# Patient Record
Sex: FEMALE | Race: OTHER | Employment: UNEMPLOYED | URBAN - METROPOLITAN AREA
[De-identification: names, ages, dates, MRNs, and addresses within clinical notes are randomized per-mention and may not be internally consistent; named-entity substitution may affect disease eponyms.]

---

## 2018-11-01 ENCOUNTER — OFFICE VISIT (OUTPATIENT)
Dept: FAMILY MEDICINE CLINIC | Age: 33
End: 2018-11-01

## 2018-11-01 DIAGNOSIS — Z53.21 PATIENT LEFT WITHOUT BEING SEEN: Primary | ICD-10-CM

## 2019-08-26 ENCOUNTER — OFFICE VISIT (OUTPATIENT)
Dept: FAMILY MEDICINE CLINIC | Age: 34
End: 2019-08-26

## 2019-08-26 VITALS
HEART RATE: 78 BPM | BODY MASS INDEX: 24.8 KG/M2 | RESPIRATION RATE: 19 BRPM | TEMPERATURE: 98.6 F | OXYGEN SATURATION: 99 % | WEIGHT: 123 LBS | SYSTOLIC BLOOD PRESSURE: 100 MMHG | HEIGHT: 59 IN | DIASTOLIC BLOOD PRESSURE: 65 MMHG

## 2019-08-26 DIAGNOSIS — Z34.90 PREGNANCY, UNSPECIFIED GESTATIONAL AGE: ICD-10-CM

## 2019-08-26 DIAGNOSIS — Z32.00 VISIT FOR CONFIRMATION OF PREGNANCY TEST RESULT WITH PHYSICAL EXAM: Primary | ICD-10-CM

## 2019-08-26 DIAGNOSIS — Z76.89 ENCOUNTER TO ESTABLISH CARE: ICD-10-CM

## 2019-08-26 LAB
HCG URINE, QL. (POC): POSITIVE
VALID INTERNAL CONTROL?: YES

## 2019-08-26 NOTE — PROGRESS NOTES
Results noted. Pt notified of results in clinic. Will set up prenatal appointment and dating ultrasound.     Pinky Small MD  PGY2 Family Medicine Resident

## 2019-08-26 NOTE — PATIENT INSTRUCTIONS
Renu Fire - [ Learning About Pregnancy ]  Instrucciones de cuidado    Holloway arvin shamir las primeras semanas del embarazo es de particular importancia para la arvin de holloway bebé. Cuídese. Cualquier cosa que perjudique holloway organismo puede perjudicar a holloway bebé. Asegúrese de asistir a todas estefania citas médicas. Los chequeos médicos regulares les ayudarán a usted y a holloway bebé a mantenerse saludables. La atención de seguimiento es manisha parte clave de holloway tratamiento y seguridad. Asegúrese de hacer y acudir a todas las citas, y llame a holloway médico si está teniendo problemas. También es manisha buena idea saber los resultados de estefania exámenes y mantener manisha lista de los medicamentos que pablito. ¿Cómo puede cuidarse en el hogar?   Dieta    · Siga manisha dieta balanceada. Asegúrese de incluir en holloway dieta abundantes frijoles (habichuelas), arvejas (chícharos) y verduras de hojas verdes.     · No se saltee las comidas ni pase muchas horas sin comer. Si tiene náuseas, trate de comer un refrigerio pequeño y saludable cada 2 a 3 horas.     · No consuma pescados que tengan [address] de carolina, adriano tiburón, pez shabana o [place]. No coma más de manisha charlene de atún a la semana.     · Albino abundantes líquidos, suficientes para que holloway orina sea de color amarillo benny o transparente adriano el agua. Si tiene [employer], el corazón o el hígado y tiene que Cammal's líquidos, hable con holloway médico antes de aumentar holloway consumo.     · Reduzca la cafeína, adriano el café, el té y las bebidas de cola.     · No albino alcohol, adriano cerveza, vino o licor alanna.     · [place] un multivitamínico que contenga al menos 400 microgramos (mcg) de ácido fólico para ayudar a prevenir los defectos congénitos (de nacimiento). El cereal enriquecido y el ceja integral son buenas fonseca adicionales de ácido fólico.     · Aumente el calcio en holloway Celesta Florentino. Trate de beber un cuarto de galón de [employer].  También podría morris suplementos de calcio y elegir alimentos adriano el queso y el yogur.   Geraldine Phenes de diane    · Asegúrese de asistir a las citas de seguimiento.     · Descanse lo suficiente. Mientras esté embarazada podría sentirse más cansada de lo normal.     · Brock por lo menos 30 minutos de ejercicio la mayoría de los días de la [place]. Caminar es manisha buena opción. Si no ha hecho ejercicio en el pasado, comience poco a poco. Brock varias caminatas cortas todos los días.     · No fume. Si necesita ayuda para dejar de fumar, hable con holloway médico sobre los programas para dejar de fumar. Éstos pueden aumentar estefania probabilidades de dejar el hábito para siempre.     · No toque heces de froylan ni holloway caja de excrementos. Además, lávese las vera luego de manipular carne cruda y cocine andrew toda la carne antes de comerla. Use guantes cuando trabaje en el jardín y [employer] vera cuando termine. Las heces de [place], la carne cruda o poco cocida, y la libia contaminada pueden causar infecciones que podrían perjudicar a holloway bebé o conducir a un aborto espontáneo.     · No use \"jacuzzis\" ni saunas. Elevar la temperatura corporal podría perjudicar a holloway bebé.   · Evite los gases de las sustancias químicas y la pintura, o los venenos.     · No use drogas ilegales ni albino alcohol. Medicamentos    · Revise todos los medicamentos que esté tomando con holloway Roselee Peaks sea necesario cambiar algunos de estefania medicamentos de rutina para proteger al bebé.   · [place] acetaminofén (Tylenol) para [facility], adriano dolor de [place] o de espalda leves o fiebre leve con síntomas de resfriado. No utilice medicamentos antiinflamatorios no esteroideos (RAVINDRA), tales adriano ibuprofeno (Advil, Motrin) o naproxeno (Aleve), a menos que holloway médico lo autorice.     · No tome dos o más analgésicos (medicamentos para el dolor) al [employer] tiempo a menos que el médico se lo haya indicado. Muchos analgésicos contienen acetaminofén, es decir, Tylenol.  El exceso de acetaminofén (Tylenol) puede ser dañino.     · [place] los medicamentos exactamente adriano le fueron recetados. Llame a holloway médico si vance estar teniendo problemas con holloway medicamento.    Para manejar las náuseas del embarazo    · Si siente náuseas al levantarse, pruebe morris un refrigerio pequeño (adriano galletas saladas) antes de salir de la cama. Dese algún tiempo para digerir el refrigerio y salga de la cama lentamente.     · No se saltee las comidas ni pase mucho tiempo sin comer. Un estómago vacío puede empeorar las náuseas.     · Consuma comidas pequeñas y frecuentes en lugar de renato comidas abundantes al día.     · [place] abundantes líquidos. Las bebidas deportivas, adriano Gatorade o Powerade, son buenas opciones.     · Consuma alimentos ricos en proteínas georgette bajos en grasas.     · Si está tomando suplementos de sandy, pregúntele a holloway médico si son necesarios. El sandy puede empeorar las náuseas.     · Evite cualquier [employer] le produzca náuseas, adriano el del café.     · Descanse mucho. Las náuseas del embarazo podrían empeorar si está cansada. ¿Dónde puede encontrar más información en inglés? Dipti pelayo http://al-lyndsey.info/. Trisha Babak E561 en la búsqueda para aprender más acerca de \"Aprenda sobre el [place]inny [place] - [ Vini Copping About Pregnancy ]. \"  Revisado: 5 septiembre, 2018  Versión del contenido: 12.1  © 9761-9099 Healthwise, Incorporated. Las instrucciones de cuidado fueron adaptadas bajo licencia por [employer] (which disclaims liability or warranty for this information). Si usted tiene [place] afección médica o sobre estas instrucciones, siempre pregunte a holloway profesional de arvin. [facility] niega toda garantía o responsabilidad por holloway uso de esta información.

## 2019-08-26 NOTE — PROGRESS NOTES
Progress Note    Patient: Rey Soliman MRN: <[id number]>  SSN: [id number]    YOB: 1985  Age: 29 y.o. Sex: female        Chief Complaint   Patient presents with    Confirmation     Pregnancy         Subjective:     Problems addressed:  Encounter Diagnoses     ICD-10-CM ICD-9-CM   1. Visit for confirmation of pregnancy test result with physical exam Z32.00 V72.40   2. Pregnancy, unspecified gestational age Z27.80 V22.2   1. Encounter to establish care Z76.89 V65.8     Pt 30 y/o F who presents to establish care and for prenatal care. Today is first time patient to be seen at our clinic, denies any prior PCP or care for current pregnancy. Pt denies any PMH, is not taking any medications. Pt is . Reports LMP was 19 but unsure of exact date. Had positive pregnancy test at home. Denies any fever, headache, chest pain, nausea, vomiting, vaginal bleeding or discharge. +FM. Says she has not had a pap smear done. Current and past medical information:    Current Medications after this visit[de-identified]     Current Outpatient Medications   Medication Sig    prenatal vit-calcium-iron-fa (PRENATAL PLUS WITH CALCIUM) 27 mg iron- 1 mg tab Take 1 Tab by mouth daily. No current facility-administered medications for this visit. There are no active problems to display for this patient. No past medical history on file. No Known Allergies    No past surgical history on file. Social History     Socioeconomic History    Marital status: SINGLE     Spouse name: Not on file    Number of children: Not on file    Years of education: Not on file    Highest education level: Not on file   Tobacco Use    Smoking status: Never Smoker   Substance and Sexual Activity    Alcohol use: Not Currently         Review of Systems   Constitutional: Negative for chills and fever. Respiratory: Negative for cough and shortness of breath. Cardiovascular: Negative for chest pain and palpitations. Gastrointestinal: Negative for abdominal pain, constipation, diarrhea, nausea and vomiting. Genitourinary: Negative for dysuria and frequency. Skin: Negative for itching and rash. Neurological: Negative for dizziness and headaches. Objective:     Vitals:    08/26/19 1121   BP: 100/65   Pulse: 78   Resp: 19   Temp: 98.6 °F (37 °C)   SpO2: 99%   Weight: 123 lb (55.8 kg)   Height: 4' 11\" (1.499 m)      Body mass index is 24.84 kg/m². Physical Exam   Constitutional: She appears well-developed and well-nourished. No distress. HENT:   Head: Normocephalic and atraumatic. Right Ear: External ear normal.   Left Ear: External ear normal.   Mouth/Throat: Oropharynx is clear and moist. No oropharyngeal exudate. Eyes: Pupils are equal, round, and reactive to light. Conjunctivae are normal.   Neck: Normal range of motion. Neck supple. Cardiovascular: Normal rate, regular rhythm and normal heart sounds. No murmur heard. Pulmonary/Chest: Effort normal and breath sounds normal. No respiratory distress. She has no wheezes. She has no rales. Abdominal: Soft. Bowel sounds are normal. There is no tenderness. Gravid, fundal height measured to 20cm, fetal heart rate 140 by doppler    Musculoskeletal: She exhibits no edema or deformity. Lymphadenopathy:     She has no cervical adenopathy. Neurological: She is alert. Skin: Skin is warm and dry. No rash noted. She is not diaphoretic. No erythema. Psychiatric: She has a normal mood and affect. Her behavior is normal.   Vitals reviewed. Health Maintenance Due   Topic Date Due    DTaP/Tdap/Td series (1 - Tdap) 04/21/2006    PAP AKA CERVICAL CYTOLOGY  04/21/2006    Influenza Age 9 to Adult  08/01/2019       Assessment and orders:     Encounter Diagnoses     ICD-10-CM ICD-9-CM   1. Visit for confirmation of pregnancy test result with physical exam Z32.00 V72.40   2. Pregnancy, unspecified gestational age Z27.80 V22.2   1.  Encounter to establish care Z76.89 V65.8       28 y/o F with confirmation of pregnancy    1. Pregnancy - positive UPT, pt states LMP 6/30/19 which would put [place] at [address], however, pt appears to be much farther along on exam, fundal height 20cm,  and easily obtained by doppler  -prenatal vit-calcium-iron-fa (PRENATAL PLUS WITH CALCIUM) 27 mg iron- 1 mg tab; Take 1 Tab by mouth daily. Dispense: 90 Tab; Refill: 1  -RTC in 1 week for initial prenatal visit, pt voiced understanding  -Pt to be scheduled for dating ultrasound as soon as possible           Plan of care:  Discussed diagnoses in detail with patient. Medication risks/benefits/side effects discussed with patient. All of the patient's questions were addressed. The patient understands and agrees with our plan of care. The patient knows to call back if they are unsure of or forget any changes we discussed today or if the symptoms change. The patient received an After-Visit Summary which contains VS, orders, medication list and allergy list. This can be used as a \"mini-medical record\" should they have to seek medical care while out of town. Follow-up and Dispositions    · Return in about 1 week (around 9/2/2019) for Initial Prenatal, also needs to be scheduled for dating ultrasound as soon as possible. No future appointments.     Signed By: Candi Puckett MD     August 26, 2019

## 2019-09-03 ENCOUNTER — INITIAL PRENATAL (OUTPATIENT)
Dept: FAMILY MEDICINE CLINIC | Age: 34
End: 2019-09-03

## 2019-09-03 VITALS
RESPIRATION RATE: 16 BRPM | WEIGHT: 124 LBS | HEIGHT: 59 IN | OXYGEN SATURATION: 100 % | SYSTOLIC BLOOD PRESSURE: 90 MMHG | TEMPERATURE: 98.1 F | BODY MASS INDEX: 25 KG/M2 | DIASTOLIC BLOOD PRESSURE: 56 MMHG | HEART RATE: 85 BPM

## 2019-09-03 DIAGNOSIS — Z34.00 INITIAL OBSTETRIC VISIT, ANTEPARTUM: Primary | ICD-10-CM

## 2019-09-03 LAB
ANTIBODY SCREEN, EXTERNAL: NEGATIVE
ANTIBODY SCREEN, EXTERNAL: NEGATIVE
BILIRUB UR QL STRIP: NEGATIVE
GLUCOSE UR-MCNC: NEGATIVE MG/DL
HBSAG, EXTERNAL: NEGATIVE
HIV, EXTERNAL: NEGATIVE
KETONES P FAST UR STRIP-MCNC: NEGATIVE MG/DL
PH UR STRIP: 7.5 [PH] (ref 4.6–8)
PROT UR QL STRIP: NEGATIVE
RPR, EXTERNAL: NORMAL
RUBELLA, EXTERNAL: NORMAL
SP GR UR STRIP: 1.02 (ref 1–1.03)
TYPE, ABO & RH, EXTERNAL: NORMAL
UA UROBILINOGEN AMB POC: NORMAL (ref 0.2–1)
URINALYSIS CLARITY POC: CLEAR
URINALYSIS COLOR POC: YELLOW
URINE BLOOD POC: NORMAL
URINE LEUKOCYTES POC: NEGATIVE
URINE NITRITES POC: NEGATIVE

## 2019-09-03 NOTE — PATIENT INSTRUCTIONS
[place] a holloway médico shamir el embarazo (hasta las 20 semanas) - [ Learning About When to Call Your Doctor During Pregnancy (Up to 20 Weeks) ]  Instrucciones de cuidado    Es normal que tenga inquietudes acerca de lo que podría ser un problema shamir el [facility]. Aunque la mayoría de las mujeres embarazadas no tienen ningún problema grave, es importante saber cuándo llamar a holloway médico si tiene determinados síntomas. Estas son algunas sugerencias generales. Holloway médico puede darle más información sobre cuándo llamar. Cuándo llamar a holloway médico (hasta las 20 semanas)  Llame al 911 en cualquier momento que sospeche que puede necesitar atención de [place]. Por ejemplo, llame si:  · Se desmayó (perdió el conocimiento). Llame a holloway médico ahora mismo o busque atención médica inmediata si:  · Tiene fiebre. · Tiene sangrado vaginal.  · Está mareada o aturdida, o siente que puede desmayarse. · Tiene síntomas de manisha infección del tracto urinario. Estos pueden incluir:  ? Dolor o ardor al orinar. ? Necesidad de orinar con frecuencia sin poder eliminar mucha orina. ? Dolor en el flanco, que se encuentra destiny debajo de la caja torácica y [place] de la cintura en un lado de la espalda. ? [employer]. · Tiene dolor abdominal.  · Daily que está teniendo contracciones. · Tiene manisha pérdida repentina de líquido por la vagina. Preste especial atención a los cambios en holloway arvin y asegúrese de comunicarse con holloway médico si:  · Tiene flujo vaginal con un olor desagradable. · Tiene otras inquietudes acerca de holloway embarazo. La atención de seguimiento es manisha parte clave de holloway tratamiento y seguridad. Asegúrese de hacer y acudir a todas las citas, y llame a holloway médico si está teniendo problemas. También es manisha buena idea saber los resultados de estefania exámenes y mantener manisha lista de los medicamentos que pablito. ¿Dónde puede encontrar más información en inglés?   Vidhi Klever a http://al-lyndsey.info/. Maryfrances Carrel [id number] en la búsqueda para aprender más acerca de \"Aprenda cuándo llamar a holloway médico shamir el embarazo ([facility] 20 semanas) - [ Learning About When to Call Your Doctor During Pregnancy (Up to 20 Weeks) ]. \"  Revisado: 5 septiembre, 2018  Versión del contenido: 12.1  © 9253-6660 Healthwise, Incorporated. Las instrucciones de cuidado fueron adaptadas bajo licencia por [employer] (which disclaims liability or warranty for this information). Si usmai tiene [place] afección médica o sobre estas instrucciones, siempre pregunte a holloway profesional de arvin. Healthwise, Incorporated niega toda garantía o responsabilidad por holloway uso de esta información. Conteo de las patadas de holloway bebé: Instrucciones de cuidado - [ [place] Your [de-identified] Marcio: Care Instructions ]  Instrucciones de cuidado    Contar las patadas de holloway bebé es manisha manera en la que holloway médico puede establecer si holloway bebé es saludable. La mayoría de las [facility], sobre todo en el primer embarazo, siente que holloway bebé se mueve por primera vez entre las semanas 12 y 25. El movimiento puede sentirse más adriano aleteos que adriano patadas. Es posible que holloway bebé se mueva más a ciertas horas del día. Cuando usmai [facility], puede notar menos patadas que cuando está descansando. En estefania visitas prenatales, holloway médico le preguntará si el bebé está activo. En el último trimestre, holloway médico le puede pedir que cuente la cantidad de veces que sienta que el bebé se [place]. La atención de seguimiento es manisha parte clave de holloway tratamiento y seguridad. Asegúrese de hacer y acudir a todas las citas, y llame a holloway médico si está teniendo problemas. También es manisha buena idea saber los resultados de estefania exámenes y mantener manisha lista de los medicamentos que pablito. ¿Cómo se cuentan las patadas fetales?   · Un método común para revisar el movimiento de holloway bebé es contar la cantidad de patadas o movimientos que sienta en 1 hora. Es normal sentir 10 movimientos (adriano patadas, aleteos o vueltas) en 1 hora. Algunos médicos sugieren que cuente shamir la [facility] llegar a los 10 movimientos. Luego usted puede dejar de contar por jabier día y comenzar otra vez al día siguiente. · Para contar, elija el momento del día en que holloway bebé esté más activo. Podría ser cualquier momento entre la mañana y la noche. · Si no siente 10 movimientos en manisha hora, es posible que holloway bebé esté durmiendo. Espere hasta la próxima hora y vuelva a contar. ¿Cuándo debe pedir ayuda? Llame a holloway médico ahora mismo o busque atención médica inmediata si:    · Siente que holloway bebé ha dejado de moverse o se mueve mucho menos de lo normal.    Preste especial atención a los cambios en holloway arvin y asegúrese de comunicarse con holloway médico si tiene cualquier problema. ¿Dónde puede encontrar más información en inglés? Cameron Bowser a http://al-lyndsey.info/. Demetra Rubens Y903 en la búsqueda para aprender más acerca de \"Conteo de las patadas de holloway bebé: Instrucciones de cuidado - [ Counting Your Baby's Kicks: Care Instructions ]. \"  Revisado: 5 septiembre, 2018  Versión del contenido: 12.1  © 5423-2354 Healthwise, Incorporated. Las instrucciones de cuidado fueron adaptadas bajo licencia por [employer] (which disclaims liability or warranty for this information). Si usted tiene [place] afección médica o sobre estas instrucciones, siempre pregunte a holloway profesional de arvin. Healthwise, Incorporated niega toda garantía o responsabilidad por holloway uso de esta información.

## 2019-09-03 NOTE — PROGRESS NOTES
28 yo female here for initial OB visit        Term, vaginal -- no complications    Declined genetic screening    PAP smear done today    Has MFM US scheduled on     I reviewed with the resident the medical history and the resident's findings on the physical examination. I discussed with the resident the patient's diagnosis and concur with the plan.

## 2019-09-03 NOTE — PROGRESS NOTES
Subjective:   Nelly Montenegro is a 29 y.o. female who is being seen today for her first obstetrical visit. [employer]  for Lebanese translation #[id number]    This is not a planned pregnancy. She is at Unknown gestation by LMP (LMP 19) which would put her at 9w2d but at her last visit, she was already 20cm by fundal height. [id number]  · G1  pregnancy - Delivery method: vaginal.  Fetal weight 3.800kg. Complications: none. · G2 pregnancy - Delivery method: vaginal. Fetal weight: 2.500kg. Complications: none. · G3  pregnancy - Delivery method: vaginal. Fetal weight: 4.500kg. Complications: none. OB History    Para Term  AB Living   4 3 3 0 0 3   SAB TAB Ectopic Molar Multiple Live Births   0 0 0 0 0 3      # Outcome Date GA Lbr John/2nd Weight Sex Delivery Anes PTL Lv   4 Current            3 Term 07 39w0d   F Vag-Spont None  ANDRE   2 Term 06 39w6d   M Vag-Spont None  ANDRE   1 Term 03 39w4d   M Vag-Spont None  ANDRE       History of GDM or DM? no    History of GHTN or HTN? no    History of pre-eclampsia? no    Relationship with FOB: spouse, not living together. He recently got deported back to his Country so she is here with her kids. Has sister who takes care of her and is very supportive. Yes taking prenatal vitamins. Desires second trimester fetal anatomy ultrasound? Yes    Desires genetic testing for Down's Syndrome? Not currently. Will think about it. No Fhx of genetic/chromosomal abnormalities      Allergies- reviewed:   No Known Allergies      Medications- reviewed:   Current Outpatient Medications   Medication Sig    prenatal vit-calcium-iron-fa (PRENATAL PLUS WITH CALCIUM) 27 mg iron- 1 mg tab Take 1 Tab by mouth daily. No current facility-administered medications for this visit. Past Medical History- reviewed:  No past medical history on file. Past Surgical History- reviewed:   No past surgical history on file.       Social History- reviewed:  Social History     Socioeconomic History    Marital status: SINGLE     Spouse name: Not on file    Number of children: Not on file    Years of education: Not on file    Highest education level: Not on file   Occupational History    Not on file   Social Needs    Financial resource strain: Not on file    Food insecurity:     Worry: Not on file     Inability: Not on file    Transportation needs:     Medical: Not on file     Non-medical: Not on file   Tobacco Use    Smoking status: Never Smoker    Smokeless tobacco: Never Used   Substance and Sexual Activity    Alcohol use: Not Currently    Drug use: Not Currently    Sexual activity: Yes   Lifestyle    Physical activity:     Days per week: Not on file     Minutes per session: Not on file    Stress: Not on file   Relationships    Social connections:     Talks on phone: Not on file     Gets together: Not on file     Attends Pent[employer]ostal service: Not on file     Active member of club or organization: Not on file     Attends meetings of clubs or organizations: Not on file     Relationship status: Not on file    Intimate partner violence:     Fear of current or ex partner: Not on file     Emotionally abused: Not on file     Physically abused: Not on file     Forced sexual activity: Not on file   Other Topics Concern    Not on file   Social History Narrative    Not on file         Immunizations- reviewed: There is no immunization history on file for this patient.       ROS  : Denies vaginal bleeding and leaking of fluid  GI:  No occasional nausea and vomiting      Objective:     Visit Vitals  BP 90/56 (BP 1 Location: Right arm, BP Patient Position: Sitting)   Pulse 85   Temp 98.1 °F (36.7 °C) (Oral)   Resp 16   Ht 4' 11\" (1.499 m)   Wt 124 lb (56.2 kg)   LMP 06/30/2019 (LMP Unknown)   SpO2 100%   BMI 25.04 kg/m²       Physical Exam:  GENERAL APPEARANCE: alert, well appearing, in no apparent distress  HEAD: normocephalic, atraumatic   LUNGS: clear to auscultation, no wheezes, rales or rhonchi, symmetric air entry  HEART: regular rate and rhythm, no murmurs  ABDOMEN: fundus soft, nontender 21 cm and FHT present at 140 bpm  BACK: no CVA tenderness  EXTERNAL GENITALIA: normal appearing vulva with no masses, tenderness or lesions  VAGINA: no abnormal discharge or lesions  CERVIX: no lesions or cervical motion tenderness  UTERUS: gravid  ADNEXA: no masses palpable and nontender  EXTREMITIES: no redness or tenderness in the calves or thighs, no edema  NEUROLOGICAL: alert, oriented, normal speech, no focal findings or movement disorder noted  SKIN: normal coloration and turgor, no rashes    Exam chaperoned by Shelly Andersen LPN    Labs:    Recent Results (from the past 12 hour(s))   AMB POC URINALYSIS DIP STICK AUTO W/O MICRO    Collection Time: 09/03/19 11:59 AM   Result Value Ref Range    Color (UA POC) Yellow     Clarity (UA POC) Clear     Glucose (UA POC) Negative Negative    Bilirubin (UA POC) Negative Negative    Ketones (UA POC) Negative Negative    Specific gravity (UA POC) 1.020 1.001 - 1.035    Blood (UA POC) Trace Negative    pH (UA POC) 7.5 4.6 - 8.0    Protein (UA POC) Negative Negative    Urobilinogen (UA POC) 0.2 mg/dL 0.2 - 1    Nitrites (UA POC) Negative Negative    Leukocyte esterase (UA POC) Negative Negative         Assessment:   34yoF [id number]  Pregnancy at unknown GA since LMP is inaccurate. ICD-10-CM ICD-9-CM    1.  Initial obstetric visit, antepartum Z34.00 V22.0 AMB POC URINALYSIS DIP STICK AUTO W/O MICRO      HEMOGLOBIN FRACTIONATION      VZV AB, IGG      CBC WITH AUTOMATED DIFF      HEP B SURFACE AG      RUBELLA AB, IGG      BLOOD TYPE, (ABO+RH)      ANTIBODY SCREEN      RPR      HIV 1/2 AG/AB, 4TH GENERATION,W RFLX CONFIRM      PAP IGP,APTIMA CTNG AGE GDLN      CULTURE, URINE         Plan:   · Initial labs/specimens collected (CBC, blood type & screen, Rh antibody screen, rubella AB IgG, HBsAg titer, RPR, HIV 4th generation reflex, UA and UCx, pap smear with gonorrhea/chlamydia PCR, VZV AB IgG, Hemoglobin Fractionation)  · Recommended prenatal vitamins  · Request for 2nd trimester fetal anatomy ultrasound faxed to [facility]. Appt set up for 9/12/19  · We discussed first trimester screening (including blood sampling), its significance and its limitations. We also informed her that only invasive testing-chorionic villus sampling ([facility]) or amniocentesis will give a definitive result on the fetal karyotype. After counseling, the patient opted to have NOT screening. · Genetic testing for Down Syndrome: Not interested currently. · UA today: Trace blood. Urine culture sent. · Follow-up in 2 week(s) for routine OB visit      Discussed the patient's BMI with her. The BMI follow up plan is as follows: Normal  BMI. Patient can gain 25-35lbs. The patient was counseled on the dangers of tobacco use and patient is a non-smoker. Emergency contact info confirmed:   Cell: [phone]    Orders Placed This Encounter    CULTURE, URINE    HEMOGLOBIN FRACTIONATION    VZV AB, IGG    CBC WITH AUTOMATED DIFF    HEP B SURFACE AG    RUBELLA AB, IGG    RPR    HIV 1/2 AG/AB, 4TH GENERATION,W RFLX CONFIRM    AMB POC URINALYSIS DIP STICK AUTO W/O MICRO    TYPE, ABO & RH    ANTIBODY SCREEN    PAP IGP,APTIMA CTNG AGE GDLN     Order Specific Question:   Pap Source? Answer:   Endocervical     Order Specific Question:   Total Hysterectomy? Answer:   No     Order Specific Question:   Supracervical Hysterectomy? Answer:   No     Order Specific Question:   Post Menopausal?     Answer:   No     Order Specific Question:   Hormone Therapy? Answer:   No     Order Specific Question:   IUD? Answer:   No     Order Specific Question:   Abnormal Bleeding? Answer:   No     Order Specific Question:   Pregnant     Answer:   Yes     Order Specific Question:   Post Partum?      Answer:   No         I have discussed the diagnosis with the patient and the intended plan as seen in the above orders. The patient has received an after-visit summary and questions were answered concerning future plans. I have discussed medication side effects and warnings with the patient as well. Informed pt to return to the office or go to the ER if she experiences vaginal bleeding, vaginal discharge, leaking of fluid, pelvic cramping.       Pt seen and discussed with Dr. Darron Steen (attending physician)    Mabel Dempsey MD  Family Medicine Resident

## 2019-09-03 NOTE — PROGRESS NOTES
Identified Patient with two Patient identifiers (Name and ). Two Patient Identifiers confirmed. Reviewed record in preparation for visit and have obtained necessary documentation. Chief Complaint   Patient presents with    Initial Prenatal Visit       Visit Vitals  BP 90/56 (BP 1 Location: Right arm, BP Patient Position: Sitting)   Pulse 85   Temp 98.1 °F (36.7 °C) (Oral)   Resp 16   Ht 4' 11\" (1.499 m)   Wt 124 lb (56.2 kg)   SpO2 100%   BMI 25.04 kg/m²       1. Have you been to the ER, urgent care clinic since your last visit? Hospitalized since your last visit? No    2. Have you seen or consulted any other health care providers outside of the [address] [place] [address] since your last visit? Include any pap smears or colon screening. No    + fetal movement. Patient denies any vaginal bleeding, LOF, or abnormal d/c.     Patient scheduled with [facility] 19 at [address] [place] [address] for ultrasound

## 2019-09-04 NOTE — PROGRESS NOTES
Pt presenting late for prenatal care    Will be getting MFM anatomy scan and dating in the next week as suspect she is already > 20 weeks based on resident's exam

## 2019-09-05 LAB
ABO GROUP BLD: NORMAL
BACTERIA UR CULT: NORMAL
BASOPHILS # BLD AUTO: 0 X10E3/UL (ref 0–0.2)
BASOPHILS NFR BLD AUTO: 0 %
BLD GP AB SCN SERPL QL: NEGATIVE
EOSINOPHIL # BLD AUTO: 0 X10E3/UL (ref 0–0.4)
EOSINOPHIL NFR BLD AUTO: 1 %
ERYTHROCYTE [DISTWIDTH] IN BLOOD BY AUTOMATED COUNT: 13.2 % (ref 12.3–15.4)
HBV SURFACE AG SERPL QL IA: NEGATIVE
HCT VFR BLD AUTO: 30.1 % (ref 34–46.6)
HGB A MFR BLD: 97.4 % (ref 96.4–98.8)
HGB A2 MFR BLD COLUMN CHROM: 2.6 % (ref 1.8–3.2)
HGB BLD-MCNC: 9.9 G/DL (ref 11.1–15.9)
HGB C MFR BLD: 0 %
HGB F MFR BLD: 0 % (ref 0–2)
HGB FRACT BLD-IMP: NORMAL
HGB OTHER MFR BLD HPLC: 0 %
HGB S BLD QL SOLY: NEGATIVE
HGB S MFR BLD: 0 %
HIV 1+2 AB+HIV1 P24 AG SERPL QL IA: NON REACTIVE
IMM GRANULOCYTES # BLD AUTO: 0.1 X10E3/UL (ref 0–0.1)
IMM GRANULOCYTES NFR BLD AUTO: 1 %
LYMPHOCYTES # BLD AUTO: 1.9 X10E3/UL (ref 0.7–3.1)
LYMPHOCYTES NFR BLD AUTO: 22 %
MCH RBC QN AUTO: 30.6 PG (ref 26.6–33)
MCHC RBC AUTO-ENTMCNC: 32.9 G/DL (ref 31.5–35.7)
MCV RBC AUTO: 93 FL (ref 79–97)
MONOCYTES # BLD AUTO: 0.4 X10E3/UL (ref 0.1–0.9)
MONOCYTES NFR BLD AUTO: 4 %
NEUTROPHILS # BLD AUTO: 6.3 X10E3/UL (ref 1.4–7)
NEUTROPHILS NFR BLD AUTO: 72 %
PLATELET # BLD AUTO: 281 X10E3/UL (ref 150–450)
RBC # BLD AUTO: 3.24 X10E6/UL (ref 3.77–5.28)
RH BLD: POSITIVE
RPR SER QL: NON REACTIVE
RUBV IGG SERPL IA-ACNC: 2.99 INDEX
VZV IGG SER IA-ACNC: 285 INDEX
WBC # BLD AUTO: 8.7 X10E3/UL (ref 3.4–10.8)

## 2019-09-10 LAB
AGE GDLN ACOG TESTING, 191185: NORMAL
C TRACH RRNA CVX QL NAA+PROBE: NEGATIVE
CYTOLOGIST CVX/VAG CYTO: ABNORMAL
CYTOLOGY CVX/VAG DOC CYTO: ABNORMAL
CYTOLOGY CVX/VAG DOC THIN PREP: ABNORMAL
DX ICD CODE: ABNORMAL
DX ICD CODE: ABNORMAL
HPV I/H RISK 4 DNA CVX QL PROBE+SIG AMP: POSITIVE
Lab: ABNORMAL
N GONORRHOEA RRNA CVX QL NAA+PROBE: NEGATIVE
OTHER STN SPEC: ABNORMAL
PATHOLOGIST CVX/VAG CYTO: ABNORMAL
STAT OF ADQ CVX/VAG CYTO-IMP: ABNORMAL

## 2019-09-12 ENCOUNTER — HOSPITAL ENCOUNTER (OUTPATIENT)
Dept: PERINATAL CARE | Age: 34
Discharge: HOME | End: 2019-09-12
Attending: OBSTETRICS & GYNECOLOGY
Payer: SELF-PAY

## 2019-09-12 PROCEDURE — 76805 OB US >/= 14 WKS SNGL FETUS: CPT | Performed by: OBSTETRICS & GYNECOLOGY

## 2019-09-18 ENCOUNTER — DOCUMENTATION ONLY (OUTPATIENT)
Dept: FAMILY MEDICINE CLINIC | Age: 34
End: 2019-09-18

## 2019-09-18 NOTE — PROGRESS NOTES
M ultrasound on 9/12/19:    GA: 29w4d with EDC 11/24/19 on 9/12/19  Anterior placenta  BREECH presentation  EFW 56%  Female fetus  BPP 8/8

## 2019-09-19 ENCOUNTER — TELEPHONE (OUTPATIENT)
Dept: FAMILY MEDICINE CLINIC | Age: 34
End: 2019-09-19

## 2019-09-19 DIAGNOSIS — O99.013 ANEMIA DURING PREGNANCY IN THIRD TRIMESTER: Primary | ICD-10-CM

## 2019-09-19 RX ORDER — FERROUS SULFATE 325(65) MG
325 TABLET, DELAYED RELEASE (ENTERIC COATED) ORAL
Qty: 90 TAB | Refills: 0 | Status: SHIPPED | OUTPATIENT
Start: 2019-09-19

## 2019-09-19 NOTE — TELEPHONE ENCOUNTER
[employer]  #[id number]    Contacted Patient because she missed her appointment on 9/17/19. Patient expressed financial difficulty as she received an expensive bill. Discussed with patient her latest U/S results and as well as prenatal labs. Will need colposcopy 6wks postpartum    Sent iron pill for her anemia to pharmacy.      Scheduled appt for patient: 9/25/19 at 11am

## 2019-09-25 ENCOUNTER — ROUTINE PRENATAL (OUTPATIENT)
Dept: FAMILY MEDICINE CLINIC | Age: 34
End: 2019-09-25

## 2019-09-25 VITALS
BODY MASS INDEX: 26 KG/M2 | RESPIRATION RATE: 18 BRPM | OXYGEN SATURATION: 100 % | SYSTOLIC BLOOD PRESSURE: 96 MMHG | HEIGHT: 59 IN | WEIGHT: 129 LBS | HEART RATE: 87 BPM | TEMPERATURE: 98.4 F | DIASTOLIC BLOOD PRESSURE: 59 MMHG

## 2019-09-25 DIAGNOSIS — Z3A.31 31 WEEKS GESTATION OF PREGNANCY: Primary | ICD-10-CM

## 2019-09-25 DIAGNOSIS — Z23 ENCOUNTER FOR IMMUNIZATION: ICD-10-CM

## 2019-09-25 LAB
BILIRUB UR QL STRIP: NEGATIVE
GLUCOSE UR-MCNC: NEGATIVE MG/DL
KETONES P FAST UR STRIP-MCNC: NEGATIVE MG/DL
PH UR STRIP: 7 [PH] (ref 4.6–8)
PROT UR QL STRIP: NEGATIVE
SP GR UR STRIP: 1.01 (ref 1–1.03)
UA UROBILINOGEN AMB POC: NORMAL (ref 0.2–1)
URINALYSIS CLARITY POC: CLEAR
URINALYSIS COLOR POC: YELLOW
URINE BLOOD POC: NEGATIVE
URINE LEUKOCYTES POC: NEGATIVE
URINE NITRITES POC: NEGATIVE

## 2019-09-25 NOTE — PROGRESS NOTES
Return OB Visit       Subjective:   Gwendolynn Render 29 y.o. [place]   MICHAEL: 11/24/2019, Date entered prior to episode creation  GA:  31w3d. [employer] #[id number]    LOF: No  Vaginal bleeding: No  Fetal movement (after 20 weeks): Yes  Contractions: No    Taking pnv and iron    Allergies- reviewed:   No Known Allergies  Medications- reviewed:   Current Outpatient Medications   Medication Sig    ferrous sulfate (IRON) 325 mg (65 mg iron) EC tablet Take 1 Tab by mouth Daily (before breakfast).  prenatal vit-calcium-iron-fa (PRENATAL PLUS WITH CALCIUM) 27 mg iron- 1 mg tab Take 1 Tab by mouth daily. No current facility-administered medications for this visit. Past Medical History- reviewed:  No past medical history on file. Past Surgical History- reviewed:   No past surgical history on file.   Social History- reviewed:  Social History     Socioeconomic History    Marital status: SINGLE     Spouse name: Not on file    Number of children: Not on file    Years of education: Not on file    Highest education level: Not on file   Occupational History    Not on file   Social Needs    Financial resource strain: Not on file    Food insecurity:     Worry: Not on file     Inability: Not on file    Transportation needs:     Medical: Not on file     Non-medical: Not on file   Tobacco Use    Smoking status: Never Smoker    Smokeless tobacco: Never Used   Substance and Sexual Activity    Alcohol use: Not Currently    Drug use: Not Currently    Sexual activity: Yes   Lifestyle    Physical activity:     Days per week: Not on file     Minutes per session: Not on file    Stress: Not on file   Relationships    Social connections:     Talks on phone: Not on file     Gets together: Not on file     Attends Judaism service: Not on file     Active member of club or organization: Not on file     Attends meetings of clubs or organizations: Not on file     Relationship status: Not on file    Intimate partner violence:     Fear of current or ex partner: Not on file     Emotionally abused: Not on file     Physically abused: Not on file     Forced sexual activity: Not on file   Other Topics Concern    Not on file   Social History Narrative    Not on file     Immunizations- reviewed: There is no immunization history for the selected administration types on file for this patient.       Objective:     Visit Vitals  BP 96/59 (BP 1 Location: Right arm, BP Patient Position: Sitting)   Pulse 87   Temp 98.4 °F (36.9 °C) (Oral)   Resp 18   Ht 4' 11\" (1.499 m)   Wt 129 lb (58.5 kg)   LMP 2019 (Exact Date)   SpO2 100%   BMI 26.05 kg/m²       Physical Exam:  GENERAL APPEARANCE: alert, well appearing, in no apparent distress  ABDOMEN: gravid, fundal height 31 cm, FHT present at 145 bpm  PSYCH: normal mood and affect    Labs  Recent Results (from the past 12 hour(s))   AMB POC URINALYSIS DIP STICK AUTO W/O MICRO    Collection Time: 19 10:55 AM   Result Value Ref Range    Color (UA POC) Yellow     Clarity (UA POC) Clear     Glucose (UA POC) Negative Negative    Bilirubin (UA POC) Negative Negative    Ketones (UA POC) Negative Negative    Specific gravity (UA POC) 1.015 1.001 - 1.035    Blood (UA POC) Negative Negative    pH (UA POC) 7.0 4.6 - 8.0    Protein (UA POC) Negative Negative    Urobilinogen (UA POC) 0.2 mg/dL 0.2 - 1    Nitrites (UA POC) Negative Negative    Leukocyte esterase (UA POC) Negative Negative         Assessment         ICD-10-CM ICD-9-CM    1. 31 weeks gestation of pregnancy Z3A.31 V22.2 AMB POC URINALYSIS DIP STICK AUTO W/O MICRO      GLUCOSE, GESTATIONAL 1 HR TOLERANCE   2. Encounter for immunization Z23 V03.89 TETANUS, DIPHTHERIA TOXOIDS AND ACELLULAR PERTUSSIS VACCINE (TDAP), IN INDIVIDS. >=7, IM      UT IMMUNIZ ADMIN,1 SINGLE/COMB VAC/TOXOID      INFLUENZA VIRUS VAC QUAD,SPLIT,PRESV FREE SYRINGE IM      UT IMMUNIZ,ADMIN,EACH ADDL         Plan   29 y.o.  31w3d MICHAEL 2019, Date entered prior to episode creation here for return OB visit     · SIUP  · RH +. Normal PNL. · Declined genetic Screening  · S/p dating/anatomy scan on 9/12/19. Breech presentation @29wks. Female fetus. Anterior placenta  · UA unremarkable today. · Influenza, Tdap, and 1hr GTT today. · Follow up in 2 weeks   · Abnormal pap: ASCUS and HPV positive. Patient request to have it deferred until 6 wks PP  · Late to care. Initial OB at 29 weeks  · Anemia: Hg 9.9 (9/3/19). Iron supplement      Orders Placed This Encounter    MS IMMUNIZ ADMIN,1 SINGLE/COMB VAC/TOXOID    MS IMMUNIZ,ADMIN,EACH ADDL    TETANUS, DIPHTHERIA TOXOIDS AND ACELLULAR PERTUSSIS VACCINE (TDAP), IN INDIVIDS. >=7, IM    INFLUENZA VIRUS VACCINE QUADRIVALENT, PRESERVATIVE FREE SYRINGE (44720)    GLUCOSE, GESTATIONAL 1 HR TOLERANCE    AMB POC URINALYSIS DIP STICK AUTO W/O MICRO     Labor precautions discussed, including: Regular painful contractions, lasting for greater than one hour, taking your breath away; any vaginal bleeding; any leakage of fluid; or absent or decreased fetal movement. Call M.D. on call if any of these symptoms or signs occur. I have discussed the diagnosis with the patient and the intended plan as seen in the above orders. The patient has received an after-visit summary and questions were answered concerning future plans. I have discussed medication side effects and warnings with the patient as well. Informed pt to return to the office or go to the ER if she experiences vaginal bleeding, vaginal discharge, leaking of fluid, pelvic cramping.     Pt discussed with Dr. Josh Foley (attending physician)    Pako Farias MD  Family Medicine Resident

## 2019-09-25 NOTE — PROGRESS NOTES
I reviewed with the resident the medical history and the resident's findings on the physical examination. I discussed with the resident the patient's diagnosis and concur with the plan. Estimated Date of Delivery: 19    35yo  @ 31w3d (hx  x3)  1. IUP: getting GTT, flu and tdap. RH pos. Declined genetic testing. Normal anatomy. 2.  Anemia: on iron   3. Late to care: 29 wk   4.   Abnormal pap: pt desires to postpone colpo to 6wk PP

## 2019-09-25 NOTE — PATIENT INSTRUCTIONS
Conteo de las patadas de holloway bebé: Instrucciones de cuidado - [ Madonna Herd Your [de-identified] Kicks: Care Instructions ]  Instrucciones de cuidado    Contar las patadas de holloway bebé es manisha manera en la que holloway médico puede establecer si holloway bebé es saludable. La mayoría de las [facility], sobre todo en el primer embarazo, siente que holloway bebé se mueve por primera vez entre las semanas 12 y 25. El movimiento puede sentirse más adriano aleteos que adriano patadas. Es posible que holloway bebé se mueva más a ciertas horas del día. Cuando usted [place], puede notar menos patadas que cuando está descansando. En estefania visitas prenatales, holloway médico le preguntará si el bebé está activo. En el último trimestre, holloway médico le puede pedir que cuente la cantidad de veces que sienta que el bebé se [place]. La atención de seguimiento es manisha parte clave de holloway tratamiento y seguridad. Asegúrese de hacer y acudir a todas las citas, y llame a holloway médico si está teniendo problemas. También es manisha buena idea saber los resultados de estefania exámenes y mantener manisha lista de los medicamentos que pablito. ¿Cómo se cuentan las patadas fetales? · Un método común para revisar el movimiento de holloway bebé es contar la cantidad de patadas o movimientos que sienta en 1 hora. Es normal sentir 10 movimientos (adriano patadas, aleteos o vueltas) en 1 hora. Algunos médicos sugieren que cuente shamir la [facility] llegar a los 10 movimientos. Luego usted puede dejar de contar por jabier día y comenzar otra vez al día siguiente. · Para contar, elija el momento del día en que holloway bebé esté más activo. Podría ser cualquier momento entre la mañana y la noche. · Si no siente 10 movimientos en manisha hora, es posible que holloway bebé esté durmiendo. Espere hasta la próxima hora y vuelva a contar. ¿Cuándo debe pedir ayuda?   Llame a holloway médico ahora mismo o busque atención médica inmediata si:    · Siente que holloway bebé ha dejado de moverse o se mueve mucho menos de lo normal.    Preste especial atención a los cambios en holloway arvin y asegúrese de comunicarse con holloway médico si tiene cualquier problema. ¿Dónde puede encontrar más información en inglés? Emmie Beaver a http://al-lyndsey.info/. Topher John T993 en la búsqueda para aprender más acerca de \"Conteo de las patadas de holloway bebé: Instrucciones de cuidado - [ Counting Your Baby's Kicks: Care Instructions ]. \"  Revisado: 29 crisostomo, 2019  Versión del contenido: 12.2  © 2012-9931 5 examples, [employer]. Las instrucciones de cuidado fueron adaptadas bajo licencia por [employer] (which disclaims liability or warranty for this information). Si usted tiene [place] afección médica o sobre estas instrucciones, siempre pregunte a holloway profesional de arvin.  5 examples, [employer] niega toda garantía o responsabilidad por holloway uso de esta información.

## 2019-09-25 NOTE — PROGRESS NOTES
Identified Patient with two Patient identifiers (Name and ). Two Patient Identifiers confirmed. Reviewed record in preparation for visit and have obtained necessary documentation. Chief Complaint   Patient presents with    Routine Prenatal Visit     31w3d       Visit Vitals  BP 96/59 (BP 1 Location: Right arm, BP Patient Position: Sitting)   Pulse 87   Temp 98.4 °F (36.9 °C) (Oral)   Resp 18   Ht 4' 11\" (1.499 m)   Wt 129 lb (58.5 kg)   SpO2 100%   BMI 26.05 kg/m²       1. Have you been to the ER, urgent care clinic since your last visit? Hospitalized since your last visit? No    2. Have you seen or consulted any other health care providers outside of the [address] [place] [address] since your last visit? Include any pap smears or colon screening. No    + fetal movement. Patient denies any vaginal bleeding, LOF, or abnormal d/c.

## 2019-09-26 LAB — GLUCOSE 1H P 50 G GLC PO SERPL-MCNC: 91 MG/DL (ref 65–139)

## 2019-09-27 LAB — GTT, 1 HR, GLUCOLA, EXTERNAL: 91

## 2019-10-09 ENCOUNTER — ROUTINE PRENATAL (OUTPATIENT)
Dept: FAMILY MEDICINE CLINIC | Age: 34
End: 2019-10-09

## 2019-10-09 VITALS
SYSTOLIC BLOOD PRESSURE: 99 MMHG | DIASTOLIC BLOOD PRESSURE: 61 MMHG | BODY MASS INDEX: 26.73 KG/M2 | TEMPERATURE: 96.9 F | RESPIRATION RATE: 18 BRPM | HEART RATE: 81 BPM | HEIGHT: 59 IN | WEIGHT: 132.6 LBS | OXYGEN SATURATION: 99 %

## 2019-10-09 DIAGNOSIS — O99.619 GASTROESOPHAGEAL REFLUX IN PREGNANCY: ICD-10-CM

## 2019-10-09 DIAGNOSIS — Z3A.33 33 WEEKS GESTATION OF PREGNANCY: Primary | ICD-10-CM

## 2019-10-09 DIAGNOSIS — K21.9 GASTROESOPHAGEAL REFLUX IN PREGNANCY: ICD-10-CM

## 2019-10-09 DIAGNOSIS — O99.013 ANEMIA DURING PREGNANCY IN THIRD TRIMESTER: ICD-10-CM

## 2019-10-09 LAB
BILIRUB UR QL STRIP: NEGATIVE
GLUCOSE UR-MCNC: NEGATIVE MG/DL
HGB BLD-MCNC: 10.5 G/DL
KETONES P FAST UR STRIP-MCNC: NEGATIVE MG/DL
PH UR STRIP: 7 [PH] (ref 4.6–8)
PROT UR QL STRIP: NEGATIVE
SP GR UR STRIP: 1.01 (ref 1–1.03)
UA UROBILINOGEN AMB POC: NORMAL (ref 0.2–1)
URINALYSIS CLARITY POC: CLEAR
URINALYSIS COLOR POC: YELLOW
URINE BLOOD POC: NEGATIVE
URINE LEUKOCYTES POC: NEGATIVE
URINE NITRITES POC: NEGATIVE

## 2019-10-09 RX ORDER — FAMOTIDINE 10 MG/1
10 TABLET ORAL 2 TIMES DAILY
Qty: 60 TAB | Refills: 3 | Status: SHIPPED | OUTPATIENT
Start: 2019-10-09 | End: 2019-11-16

## 2019-10-09 NOTE — PATIENT INSTRUCTIONS
PEPCID for HEARTBURN two times a day. PEPCID para reflujo ácido dos veces al día   ------    Semanas 32 a 34 de guevara embarazo: Instrucciones de cuidado - [ Verlie Alpha 32 to 29 of Your Pregnancy: Care Instructions ]  Instrucciones de cuidado    Shamir las últimas semanas de guevara Fay Garcia, usted podría sentir más marva y ANDOVER. Es importante que descanse cuando pueda. Guevara bebé en crecimiento está ejerciendo más presión sobre guevara vejiga. Por eso, mike vez necesite orinar con más frecuencia. Las hemorroides también son comunes. Estas son venas en la charissa del recto que causan dolor y comezón. En la semana 36, a la mayoría de las [employer] un análisis para detectar el estreptococo del debbi B (GBS, por estefania siglas en inglés). El GBS es manisha bacteria común que puede vivir en la vagina y el recto. Podría enfermar a guevara bebé después del nacimiento. Si el [employer] positivo, le darán antibióticos shamir el [place]. Estos prevendrán que el bebé se contagie con la bacteria. Podría convenirle hablar con guevara médico sobre poner en un banco la ortega del cordón umbilical de guevara bebé. Esta es la ortega que queda en el cordón después del nacimiento. Si desea guardar esta ortega, debe hacer los trámites necesarios con anticipación. No puede decidirlo en el último minuto. Si todavía no le olmedo aplicado la vacuna Tdap (tétanos, difteria y tos [place]) shamir parth [facility]i Garcia, hable con guevara médico acerca de aplicársela. Mariely Mock a proteger a guevara recién nacido contra la infección por tos ferina. La atención de seguimiento es manisha parte clave de guevara tratamiento y seguridad. Asegúrese de hacer y acudir a todas las citas, y llame a guevara médico si está teniendo problemas. También es manisha buena idea saber los resultados de estefania exámenes y mantener manisha lista de los medicamentos que pablito. ¿Cómo puede cuidarse en el hogar? Alivio de las hemorroides  · Aumente en guevara dieta la cantidad de líquidos, frutas, verduras y [place].  [place] ayudará a mantener blandas las heces. · Evite estar sentada shamir demasiado tiempo. Recuéstese sobre el lado honey varias veces al día. · Límpiese con papel higiénico suave y húmedo. O puede utilizar compresas de infusión de hamamelis [facility] (\"witch hazel\") o toallas de higiene personal.  · Si tiene malestar, pruebe a usar compresas de hielo. O puede hacer juventino de asiento tibios. Hágalos shamir 20 minutos por vez, según lo necesite. · Use manisha crema con hidrocortisona para el dolor y la picazón. Edrie Rho son Anusol y Preparation H Hydrocortisone. · Pregúntele a holloway médico si puede morris un ablandador de heces de venta clementina. Considere el amamantamiento  · Los expertos recomiendan que las mujeres amamanten por Alvaro Dhaliwal. La leche materna es el mejor alimento para los bebés. · A los bebés les resulta más fácil digerir la [employer] materna que la [employer] de [place]. Y siempre está disponible, tiene la temperatura ideal y es gratuita. · [place] ayudar a proteger a holloway bebé de algunos problemas de arvin. En comparación con los bebés alimentados con leche de [place], los bebés que se alimentan con leche materna tienen menos probabilidades de:  ? Eva Kaylin infecciones de oído, resfriados, diarrea y neumonía. ? Ser obesos o tener diabetes más adelante en holloway diane. · [employer] a radha bebés tienen menos sangrado después del Adolph. Radha úteros también recobran holloway tamaño normal más rápido. · Algunas mujeres que amamantan bajan de [place] rápido. Elaborar leche quema calorías. · El amamantamiento puede disminuir el riesgo de tener cáncer de seno (mama), cáncer de ovario y osteoporosis. Decida sobre la circuncisión para los niños  · Al morris esta decisión, podría ser de ayuda pensar en radha tradiciones personales, religiosas y familiares. Es holloway decisión si desea conservar el pene de holloway hijo natural o circuncidar a holloway hijo.   · Si decide que le gustaría que circuncidaran a holloway bebé, hable con holloway Marcel Dural cualquier inquietud que tenga sobre el dolor. También puede hablar acerca de estefania preferencias para la anestesia. ¿Dónde puede encontrar más información en inglés? Gavin Patel a http://al-lyndsey.info/. Aleena Severs I906 en la búsqueda para aprender más acerca de \"Semanas 28 a 29 de guevara embarazo: Instrucciones de cuidado - [ Davie Daryl 32 to 29 of Your Pregnancy: Care Instructions ]. \"  Revisado: 29 Post, 2019  Versión del contenido: 12.2  © 5645-7351 Healthwise, Incorporated. Las instrucciones de cuidado fueron adaptadas bajo licencia por [employer] (which disclaims liability or warranty for this information). Si usmai tiene Cassia Walling afección médica o sobre estas instrucciones, siempre pregunte a guevara profesional de arvin. Healthwise, Incorporated niega toda garantía o responsabilidad por guevara uso de esta información.       Semanas 32 a 34 de guevara embarazo: Instrucciones de cuidado - [ Davie Daryl 32 to 29 of Your Pregnancy: Care Instructions ]  Instrucciones de cuidado    Jennifer las últimas semanas de guevara Bergnajma hinkle podría sentir más marva y [place]. Es importante que descanse cuando pueda. Guevara bebé en crecimiento está ejerciendo más presión sobre guevara vejiga. Por eso, mike vez necesite orinar con más frecuencia. Las hemorroides también son comunes. Estas son venas en la charissa del recto que causan dolor y comezón. En la semana 36, a la mayoría de las [employer] un análisis para detectar el estreptococo del debbi B (GBS, por estefania siglas en inglés). El GBS es manisha bacteria común que puede vivir en la vagina y el recto. Podría enfermar a guevara bebé después del nacimiento. Si el [employer] positivo, le darán antibióticos jennifer el [place] de [place]. Estos prevendrán que el bebé se contagie con la bacteria. Podría convenirle hablar con guevara médico sobre poner en un banco la ortega del cordón umbilical de guevara bebé. Esta es la ortega que queda en el cordón después del nacimiento.  Si desea guardar esta ortega, debe hacer los trámites necesarios con anticipación. No puede decidirlo en el último minuto. Si todavía no le olmedo aplicado la vacuna Tdap (tétanos, difteria y tos [place]) shamir [facility]th [facility], hable con holloway médico acerca de aplicársela. Yahir Freedman a proteger a holloway recién nacido contra la infección por tos ferina. La atención de seguimiento es manisha parte clave de holloway tratamiento y seguridad. Asegúrese de hacer y acudir a todas las citas, y llame a holloway médico si está teniendo problemas. También es manisha buena idea saber los resultados de estefania exámenes y mantener manisha lista de los medicamentos que pablito. ¿Cómo puede cuidarse en el hogar? Alivio de las hemorroides  · Aumente en holloway dieta la cantidad de líquidos, frutas, verduras y [place]. [place] ayudará a GAYLE Linares, [employer]. · Evite estar sentada shamir demasiado tiempo. Recuéstese sobre el lado honey varias veces al día. · Límpiese con papel higiénico suave y húmedo. O puede utilizar compresas de infusión de [facility] (\"witch hazel\") o toallas de higiene personal.  · Si tiene malestar, pruebe a usar compresas de hielo. O puede hacer juventino de asiento tibios. Hágalos shamir 20 minutos por vez, según lo necesite. · Use manisha crema con hidrocortisona para el dolor y la picazón. Leeland Nestle son Anusol y Preparation H Hydrocortisone. · Pregúntele a holloway médico si puede morris un ablandador de heces de venta clementina. Considere el amamantamiento  · Los expertos recomiendan que las mujeres amamanten por Alvaro Dhaliwal. La leche materna es el mejor alimento para los bebés. · A los bebés les resulta más fácil digerir la [employer] materna que la [employer]T de [place]. Y siempre está disponible, tiene la temperatura ideal y es gratuita. · [place] ayudar a proteger a holloway bebé de algunos problemas de arvin.  En comparación con los bebés alimentados con leche de [place], los bebés que se alimentan con leche materna tienen menos probabilidades de:  ? Tener infecciones de oído, resfriados, diarrea y neumonía. ? Ser obesos o tener diabetes más adelante en holloway diane. · [employer] a radha bebés tienen menos sangrado después del Adolph. Radha úteros también recobran holloway tamaño normal más rápido. · Algunas mujeres que amamantan bajan de Luis Armando rápido. Elaborar leche quema calorías. · El amamantamiento puede disminuir el riesgo de tener cáncer de seno (mama), cáncer de ovario y osteoporosis. Decida sobre la circuncisión para los niños  · Al morris esta decisión, podría ser de ayuda pensar en radha tradiciones personales, religiosas y familiares. Es holloway decisión si desea conservar el pene de holloway hijo natural o circuncidar a holloway hijo. · Si decide que le gustaría que circuncidaran a holloway bebé, hable con holloway médico. Discuta cualquier inquietud que tenga sobre el dolor. También puede hablar acerca de radha preferencias para la anestesia. ¿Dónde puede encontrar más información en inglés? Sisi Ask a http://al-lyndsey.info/. Jaycee Danes A634 en la búsqueda para aprender más acerca de \"Semanas 28 a 29 de holloway embarazo: Instrucciones de cuidado - [ Ema Gong 32 to 29 of Your Pregnancy: Care Instructions ]. \"  Revisado: 29 [place], 2019  Versión del contenido: 12.2  © 3986-4978 Healthwise, Incorporated. Las instrucciones de cuidado fueron adaptadas bajo licencia por [employer] (which disclaims liability or warranty for this information). Si usted tiene [place] afección médica o sobre estas instrucciones, siempre pregunte a holloway profesional de arvin.  Healthwise, Incorporated niega toda garantía o responsabilidad por holloway uso de esta información.

## 2019-10-09 NOTE — PROGRESS NOTES
Sulaiman Parrish Is a 29 y.o. female  [address],  [address]  #[id number]  Patient states she has had heart burn at night therefore she drink 5-6 water bottles (16 oz) a night. Patient states she also drinks water during the day do to heart burn   Chief Complaint   Patient presents with    Routine Prenatal Visit       1. Have you been to the ER, urgent care clinic since your last visit? Hospitalized since your last visit? No  M  2. Have you seen or consulted any other health care providers outside of the [address] [place] [address] since your last visit? Include any pap smears or colon screening. No      Visit Vitals  BP 99/61 (BP 1 Location: Left arm, BP Patient Position: Sitting)   Pulse 81   Temp 96.9 °F (36.1 °C) (Oral)   Resp 18   Ht 4' 11\" (1.499 m)   Wt 132 lb 9.6 oz (60.1 kg)   SpO2 99%   BMI 26.78 kg/m²           There are no preventive care reminders to display for this patient. Medication Reconciliation completed, changes noted.   Please  Update medication list.

## 2019-10-09 NOTE — PROGRESS NOTES
Return OB Visit       Subjective:   Gary Monsivais 29 y.o. [place]   MICHAEL: 11/24/2019, Date entered prior to episode creation  GA: 33w3d. [employer] #[id number]    LOF: No  Vaginal bleeding: No  Fetal movement (after 20 weeks): Yes  Contractions: No    Taking pnv and iron    Concerns: Heartburn. 2 months and now worsens to occuring everyday because everything she eats gives her acid reflux. Has not tried anything, drinks water and it helps a little. Allergies- reviewed:   No Known Allergies  Medications- reviewed:   Current Outpatient Medications   Medication Sig    famotidine (PEPCID) 10 mg tablet Take 1 Tab by mouth two (2) times a day.  ferrous sulfate (IRON) 325 mg (65 mg iron) EC tablet Take 1 Tab by mouth Daily (before breakfast).  prenatal vit-calcium-iron-fa (PRENATAL PLUS WITH CALCIUM) 27 mg iron- 1 mg tab Take 1 Tab by mouth daily. No current facility-administered medications for this visit. Past Medical History- reviewed:  No past medical history on file. Past Surgical History- reviewed:   No past surgical history on file.   Social History- reviewed:  Social History     Socioeconomic History    Marital status: SINGLE     Spouse name: Not on file    Number of children: Not on file    Years of education: Not on file    Highest education level: Not on file   Occupational History    Not on file   Social Needs    Financial resource strain: Not on file    Food insecurity:     Worry: Not on file     Inability: Not on file    Transportation needs:     Medical: Not on file     Non-medical: Not on file   Tobacco Use    Smoking status: Never Smoker    Smokeless tobacco: Never Used   Substance and Sexual Activity    Alcohol use: Not Currently    Drug use: Not Currently    Sexual activity: Yes   Lifestyle    Physical activity:     Days per week: Not on file     Minutes per session: Not on file    Stress: Not on file   Relationships    Social connections:     Talks on phone: Not on file     Gets together: Not on file     Attends Restoration service: Not on file     Active member of club or organization: Not on file     Attends meetings of clubs or organizations: Not on file     Relationship status: Not on file    Intimate partner violence:     Fear of current or ex partner: Not on file     Emotionally abused: Not on file     Physically abused: Not on file     Forced sexual activity: Not on file   Other Topics Concern    Not on file   Social History Narrative    Not on file     Immunizations- reviewed:   Immunization History   Administered Date(s) Administered    Influenza Vaccine (Quad) PF 09/25/2019    Tdap 09/25/2019         Objective:     Visit Vitals  BP 99/61 (BP 1 Location: Left arm, BP Patient Position: Sitting)   Pulse 81   Temp 96.9 °F (36.1 °C) (Oral)   Resp 18   Ht 4' 11\" (1.499 m)   Wt 132 lb 9.6 oz (60.1 kg)   LMP 06/30/2019 (Exact Date)   SpO2 99%   BMI 26.78 kg/m²       Physical Exam:  GENERAL APPEARANCE: alert, well appearing, in no apparent distress  ABDOMEN: gravid, fundal height 33 cm, FHT present at 145 bpm  PSYCH: normal mood and affect    Labs  Recent Results (from the past 12 hour(s))   AMB POC URINALYSIS DIP STICK AUTO W/O MICRO    Collection Time: 10/09/19  9:52 AM   Result Value Ref Range    Color (UA POC) Yellow     Clarity (UA POC) Clear     Glucose (UA POC) Negative Negative    Bilirubin (UA POC) Negative Negative    Ketones (UA POC) Negative Negative    Specific gravity (UA POC) 1.010 1.001 - 1.035    Blood (UA POC) Negative Negative    pH (UA POC) 7.0 4.6 - 8.0    Protein (UA POC) Negative Negative    Urobilinogen (UA POC) 0.2 mg/dL 0.2 - 1    Nitrites (UA POC) Negative Negative    Leukocyte esterase (UA POC) Negative Negative         Assessment         ICD-10-CM ICD-9-CM    1. 33 weeks gestation of pregnancy Z3A.33 V22.2 AMB POC URINALYSIS DIP STICK AUTO W/O MICRO   2.  Anemia during pregnancy in third trimester O99.013 648.23 AMB POC HEMOGLOBIN (HGB)   3. Gastroesophageal reflux in pregnancy O99.619 646.80 famotidine (PEPCID) 10 mg tablet    K21.9 530.81          Plan   29 y.o.  33w3d MICHAEL 2019, Date entered prior to episode creation here for return OB visit     · SIUP  · RH +. Normal PNL. 1hr GTT normal.  · Declined genetic Screening  · S/p dating/anatomy scan on 19. Breech presentation @29wks. Female fetus. Anterior placenta  · UA unremarkable. · S/p Influenza andTdap  · Follow up in 2 weeks  · Abnormal pap: ASCUS and HPV positive. Patient request to have it deferred until 6 wks PP  · Late to care. Initial OB at 29 weeks  · Anemia: Hg 9.9 (9/3/19). Iron supplement. Check POC Hg today 10.5. · Breech presentation @29wks, will check presentation at next visit. · GERD. Pepcid 10mg BID. Assess next time, can increase to 20mg BID if not improved. Orders Placed This Encounter    AMB POC URINALYSIS DIP STICK AUTO W/O MICRO    AMB POC HEMOGLOBIN (HGB)    famotidine (PEPCID) 10 mg tablet     Sig: Take 1 Tab by mouth two (2) times a day. Dispense:  60 Tab     Refill:  3     Labor precautions discussed, including: Regular painful contractions, lasting for greater than one hour, taking your breath away; any vaginal bleeding; any leakage of fluid; or absent or decreased fetal movement. Call M.D. on call if any of these symptoms or signs occur. I have discussed the diagnosis with the patient and the intended plan as seen in the above orders. The patient has received an after-visit summary and questions were answered concerning future plans. I have discussed medication side effects and warnings with the patient as well. Informed pt to return to the office or go to the ER if she experiences vaginal bleeding, vaginal discharge, leaking of fluid, pelvic cramping.     Pt discussed with Dr. Monica Cotter (attending physician)    Fab Gardner MD  Family Medicine Resident

## 2019-10-09 NOTE — PROGRESS NOTES
I reviewed with the resident the medical history and the resident's findings on the physical examination. I discussed with the resident the patient's diagnosis and concur with the plan. Estimated Date of Delivery: 19     33yo  @ 33w3d (hx  x3)  1. IUP: GTT WNL, s/p flu and tdap. RH pos. Declined genetic testing. Normal anatomy. 2.  Anemia: on iron, at next visit would check POC hbg to be sure not continuing to fall   3. Late to care: 29 wk   4.   Abnormal pap: pt desires to postpone colpo to 6wk PP

## 2019-10-23 ENCOUNTER — ROUTINE PRENATAL (OUTPATIENT)
Dept: FAMILY MEDICINE CLINIC | Age: 34
End: 2019-10-23

## 2019-10-23 VITALS
TEMPERATURE: 98 F | OXYGEN SATURATION: 98 % | HEIGHT: 59 IN | RESPIRATION RATE: 19 BRPM | BODY MASS INDEX: 27.42 KG/M2 | HEART RATE: 80 BPM | WEIGHT: 136 LBS | SYSTOLIC BLOOD PRESSURE: 99 MMHG | DIASTOLIC BLOOD PRESSURE: 62 MMHG

## 2019-10-23 DIAGNOSIS — Z34.90 PREGNANCY, UNSPECIFIED GESTATIONAL AGE: Primary | ICD-10-CM

## 2019-10-23 NOTE — PROGRESS NOTES
I reviewed with the resident the medical history and the resident's findings on the physical examination. I discussed with the resident the patient's diagnosis and concur with the plan.       29 y.o. yo  at 35w3d by 24 weeks US. Pregnancy has been complicated by late prenatal care and anemia in pregnancy. +Fetal movements, no vaginal bleeding, no contractions    Visit Vitals  BP 99/62   Pulse 80   Temp 98 °F (36.7 °C)   Resp 19   Ht 4' 11\" (1.499 m)   Wt 136 lb (61.7 kg)   SpO2 98%   BMI 27.47 kg/m²           Results for orders placed or performed in visit on 10/23/19   AMB POC URINALYSIS DIP STICK AUTO W/O MICRO     Status: None   Result Value Ref Range Status    Color (UA POC) Yellow  Final    Clarity (UA POC) Clear  Final    Glucose (UA POC) Negative Negative Final    Bilirubin (UA POC) Negative Negative Final    Ketones (UA POC) Negative Negative Final    Specific gravity (UA POC) 1.015 1.001 - 1.035 Final    Blood (UA POC) Negative Negative Final    pH (UA POC) 7.0 4.6 - 8.0 Final    Protein (UA POC) Negative Negative Final    Urobilinogen (UA POC) 0.2 mg/dL 0.2 - 1 Final    Nitrites (UA POC) Negative Negative Final    Leukocyte esterase (UA POC) Negative Negative Final     +FHT @120-130    1. SIUP at 35w3d:  -Prenatal labs: Blood type Opositive, HIV/HepB NR, VZV immune,Rubella immune, T pallidumNR,  1 hour GTT 91, Anatomy scan WNL. 2. Anemia in pregnancy. On iron supplements.

## 2019-10-23 NOTE — PROGRESS NOTES
Return OB Visit     [facility] # [id number]    Subjective:   Martinez Speaker 29 y.o. [place]   MICHAEL: 11/24/2019, Date entered prior to episode creation  35w3d. LOF: No  Vaginal bleeding: No  Fetal movement (after 20 weeks): Yes  Contractions: No    Taking pnv and iron    Concerns: Heartburn. improved on Pepcid    Allergies- reviewed:   No Known Allergies  Medications- reviewed:   Current Outpatient Medications   Medication Sig    famotidine (PEPCID) 10 mg tablet Take 1 Tab by mouth two (2) times a day.  ferrous sulfate (IRON) 325 mg (65 mg iron) EC tablet Take 1 Tab by mouth Daily (before breakfast).  prenatal vit-calcium-iron-fa (PRENATAL PLUS WITH CALCIUM) 27 mg iron- 1 mg tab Take 1 Tab by mouth daily. No current facility-administered medications for this visit. Past Medical History- reviewed:  No past medical history on file. Past Surgical History- reviewed:   No past surgical history on file.   Social History- reviewed:  Social History     Socioeconomic History    Marital status: SINGLE     Spouse name: Not on file    Number of children: Not on file    Years of education: Not on file    Highest education level: Not on file   Occupational History    Not on file   Social Needs    Financial resource strain: Not on file    Food insecurity:     Worry: Not on file     Inability: Not on file    Transportation needs:     Medical: Not on file     Non-medical: Not on file   Tobacco Use    Smoking status: Never Smoker    Smokeless tobacco: Never Used   Substance and Sexual Activity    Alcohol use: Not Currently    Drug use: Not Currently    Sexual activity: Yes   Lifestyle    Physical activity:     Days per week: Not on file     Minutes per session: Not on file    Stress: Not on file   Relationships    Social connections:     Talks on phone: Not on file     Gets together: Not on file     Attends Faith service: Not on file     Active member of club or organization: Not on file     Attends meetings of clubs or organizations: Not on file     Relationship status: Not on file    Intimate partner violence:     Fear of current or ex partner: Not on file     Emotionally abused: Not on file     Physically abused: Not on file     Forced sexual activity: Not on file   Other Topics Concern    Not on file   Social History Narrative    Not on file     Immunizations- reviewed:   Immunization History   Administered Date(s) Administered    Influenza Vaccine (Quad) PF 2019    Tdap 2019         Objective:     Visit Vitals  BP 99/62   Pulse 80   Temp 98 °F (36.7 °C)   Resp 19   Ht 4' 11\" (1.499 m)   Wt 136 lb (61.7 kg)   LMP 2019 (Exact Date)   SpO2 98%   BMI 27.47 kg/m²       Physical Exam:  GENERAL APPEARANCE: alert, well appearing, in no apparent distress  ABDOMEN: gravid, fundal height 36 cm, FHT present at 125bpm  PSYCH: normal mood and affect    Labs  Recent Results (from the past 12 hour(s))   AMB POC URINALYSIS DIP STICK AUTO W/O MICRO    Collection Time: 10/23/19  9:30 AM   Result Value Ref Range    Color (UA POC) Yellow     Clarity (UA POC) Clear     Glucose (UA POC) Negative Negative    Bilirubin (UA POC) Negative Negative    Ketones (UA POC) Negative Negative    Specific gravity (UA POC) 1.015 1.001 - 1.035    Blood (UA POC) Negative Negative    pH (UA POC) 7.0 4.6 - 8.0    Protein (UA POC) Negative Negative    Urobilinogen (UA POC) 0.2 mg/dL 0.2 - 1    Nitrites (UA POC) Negative Negative    Leukocyte esterase (UA POC) Negative Negative         Assessment         ICD-10-CM ICD-9-CM    1. Pregnancy, unspecified gestational age Z27.80 V22.2 AMB POC URINALYSIS DIP STICK AUTO W/O MICRO         Plan   29 y.o.   At 35w3d by second trimester US, MICHAEL 2019,   · SIUP  · RH +. Normal PNL. 1hr GTT normal.  · Declined genetic Screening  · S/p dating/anatomy scan on 19. Breech presentation @29wks. Female fetus. Anterior placenta  · UA unremarkable.    · S/p Influenza andTdap  · Follow up in 2 weeks  · Abnormal pap: ASCUS and HPV positive. Patient request to have it deferred until 6 wks PP  · Late to care. Initial OB at 29 weeks  · Anemia: 10.5 at 33 weeks on iron supplementation   · Breech presentation @29wks, will recheck at 36 weeks. · GERD. Improved on Pepcid 10mg BID. · RTC in one week. ·       Orders Placed This Encounter    AMB POC URINALYSIS DIP STICK AUTO W/O MICRO     Labor precautions discussed, including: Regular painful contractions, lasting for greater than one hour, taking your breath away; any vaginal bleeding; any leakage of fluid; or absent or decreased fetal movement. Call M.D. on call if any of these symptoms or signs occur. I have discussed the diagnosis with the patient and the intended plan as seen in the above orders. The patient has received an after-visit summary and questions were answered concerning future plans. I have discussed medication side effects and warnings with the patient as well. Informed pt to return to the office or go to the ER if she experiences vaginal bleeding, vaginal discharge, leaking of fluid, pelvic cramping. Pt discussed with Dr. Iglesia Gorman (attending physician)    Claudia Cyr MD  Family Medicine Resident

## 2019-10-29 ENCOUNTER — ROUTINE PRENATAL (OUTPATIENT)
Dept: FAMILY MEDICINE CLINIC | Age: 34
End: 2019-10-29

## 2019-10-29 VITALS
TEMPERATURE: 97.1 F | HEIGHT: 59 IN | WEIGHT: 136.8 LBS | OXYGEN SATURATION: 98 % | RESPIRATION RATE: 18 BRPM | HEART RATE: 81 BPM | BODY MASS INDEX: 27.58 KG/M2 | SYSTOLIC BLOOD PRESSURE: 107 MMHG | DIASTOLIC BLOOD PRESSURE: 64 MMHG

## 2019-10-29 DIAGNOSIS — Z3A.36 36 WEEKS GESTATION OF PREGNANCY: Primary | ICD-10-CM

## 2019-10-29 LAB
BILIRUB UR QL STRIP: NEGATIVE
GLUCOSE UR-MCNC: NEGATIVE MG/DL
KETONES P FAST UR STRIP-MCNC: NEGATIVE MG/DL
PH UR STRIP: 6.5 [PH] (ref 4.6–8)
PROT UR QL STRIP: NEGATIVE
SP GR UR STRIP: 1.02 (ref 1–1.03)
UA UROBILINOGEN AMB POC: NORMAL (ref 0.2–1)
URINALYSIS CLARITY POC: NORMAL
URINALYSIS COLOR POC: YELLOW
URINE BLOOD POC: NORMAL
URINE LEUKOCYTES POC: NORMAL
URINE NITRITES POC: NEGATIVE

## 2019-10-29 NOTE — PROGRESS NOTES
Linh Krishnan is a 29 y.o. female    Chief Complaint   Patient presents with    Routine Prenatal Visit     36 week       1. Have you been to the ER, urgent care clinic since your last visit? Hospitalized since your last visit? No  M  2. Have you seen or consulted any other health care providers outside of the [address] [place] [address] since your last visit? Include any pap smears or colon screening. No      Visit Vitals  /64 (BP 1 Location: Left arm, BP Patient Position: Sitting)   Pulse 81   Temp 97.1 °F (36.2 °C) (Oral)   Resp 18   Ht 4' 11\" (1.499 m)   Wt 136 lb 12.8 oz (62.1 kg)   SpO2 98%   BMI 27.63 kg/m²           There are no preventive care reminders to display for this patient. Medication Reconciliation completed, changes noted.   Please  Update medication list.

## 2019-10-29 NOTE — PROGRESS NOTES
Return OB Visit         Subjective:   Avelino Buck 29 y.o. [place]   MICHAEL: 11/24/2019, Date entered prior to episode creation  36w2d    [employer]  used for Irish translation     LOF: No  Vaginal bleeding: No  Contractions: No  Fetal movement (after 20 weeks): Yes. Reports that she thinks baby is moving less than last week in the past few days. Today, baby moves at least 6x/ hour but she still thinks it is less than before. Confirmed with patient that baby is still active and moving throughout the day. Taking pnv and iron. No longer taking Pepcid. Allergies- reviewed:   No Known Allergies  Medications- reviewed:   Current Outpatient Medications   Medication Sig    prenatal vit-calcium-iron-fa (PRENATAL PLUS WITH CALCIUM) 27 mg iron- 1 mg tab Take 1 Tab by mouth daily.  famotidine (PEPCID) 10 mg tablet Take 1 Tab by mouth two (2) times a day.  ferrous sulfate (IRON) 325 mg (65 mg iron) EC tablet Take 1 Tab by mouth Daily (before breakfast). No current facility-administered medications for this visit. Past Medical History- reviewed:  No past medical history on file. Past Surgical History- reviewed:   No past surgical history on file.   Social History- reviewed:  Social History     Socioeconomic History    Marital status: SINGLE     Spouse name: Not on file    Number of children: Not on file    Years of education: Not on file    Highest education level: Not on file   Occupational History    Not on file   Social Needs    Financial resource strain: Not on file    Food insecurity:     Worry: Not on file     Inability: Not on file    Transportation needs:     Medical: Not on file     Non-medical: Not on file   Tobacco Use    Smoking status: Never Smoker    Smokeless tobacco: Never Used   Substance and Sexual Activity    Alcohol use: Not Currently    Drug use: Not Currently    Sexual activity: Yes   Lifestyle    Physical activity:     Days per week: Not on file Minutes per session: Not on file    Stress: Not on file   Relationships    Social connections:     Talks on phone: Not on file     Gets together: Not on file     Attends Restorationist service: Not on file     Active member of club or organization: Not on file     Attends meetings of clubs or organizations: Not on file     Relationship status: Not on file    Intimate partner violence:     Fear of current or ex partner: Not on file     Emotionally abused: Not on file     Physically abused: Not on file     Forced sexual activity: Not on file   Other Topics Concern    Not on file   Social History Narrative    Not on file     Immunizations- reviewed:   Immunization History   Administered Date(s) Administered    Influenza Vaccine (Quad) PF 09/25/2019    Tdap 09/25/2019         Objective:     Visit Vitals  /64 (BP 1 Location: Left arm, BP Patient Position: Sitting)   Pulse 81   Temp 97.1 °F (36.2 °C) (Oral)   Resp 18   Ht 4' 11\" (1.499 m)   Wt 136 lb 12.8 oz (62.1 kg)   LMP 06/30/2019 (Exact Date)   SpO2 98%   BMI 27.63 kg/m²       Physical Exam:  GENERAL APPEARANCE: alert, well appearing, in no apparent distress  ABDOMEN: gravid, fundal height 37 cm, FHT present at 135bpm  PSYCH: normal mood and affect  Exam chaperoned by Barak Farooq LPN    Labs  Recent Results (from the past 12 hour(s))   AMB POC URINALYSIS DIP STICK AUTO W/O MICRO    Collection Time: 10/29/19  4:18 PM   Result Value Ref Range    Color (UA POC) Yellow     Clarity (UA POC) Slightly Cloudy     Glucose (UA POC) Negative Negative    Bilirubin (UA POC) Negative Negative    Ketones (UA POC) Negative Negative    Specific gravity (UA POC) 1.020 1.001 - 1.035    Blood (UA POC) Trace Negative    pH (UA POC) 6.5 4.6 - 8.0    Protein (UA POC) Negative Negative    Urobilinogen (UA POC) 1 mg/dL 0.2 - 1    Nitrites (UA POC) Negative Negative    Leukocyte esterase (UA POC) 1+ Negative         Assessment         ICD-10-CM ICD-9-CM    1. 36 weeks gestation of pregnancy Z3A.36 V22.2 AMB POC URINALYSIS DIP STICK AUTO W/O MICRO         Plan   29 y.o.  at 36w2d by second trimester US, MICHAEL 2019,   · SIUP  · RH +. Normal PNL. 1hr GTT normal.  · Declined genetic Screening  · S/p dating/anatomy scan on 19. Breech presentation @29wks. Female fetus. Anterior placenta  · UA unremarkable. · S/p Influenza and Tdap  · GBS today  · RTC in one week  · Abnormal pap: ASCUS and HPV positive. Patient request to have it deferred until 6 wks PP  · Late to care. Initial OB at 29 weeks  · Anemia: 10.5 at 33 weeks on iron supplementation   · Breech presentation @29wks. Cephalic presentation confirmed by ultrasound on 10/29/19. · GERD. Was on Pepcid 10mg BID. Now resolved. · Counseled patient on kick counts. Should have 3 kicks/hour or 10 kicks every 2 hours. ED precautions given to patient if baby moves less than the kick counts. Orders Placed This Encounter    AMB POC URINALYSIS DIP STICK AUTO W/O MICRO     Labor precautions discussed, including: Regular painful contractions, lasting for greater than one hour, taking your breath away; any vaginal bleeding; any leakage of fluid; or absent or decreased fetal movement. Call M.D. on call if any of these symptoms or signs occur. I have discussed the diagnosis with the patient and the intended plan as seen in the above orders. The patient has received an after-visit summary and questions were answered concerning future plans. I have discussed medication side effects and warnings with the patient as well. Informed pt to return to the office or go to the ER if she experiences vaginal bleeding, vaginal discharge, leaking of fluid, pelvic cramping.     Pt discussed with Dr. Nelia Grimaldo physician)    Yaima Edgar MD  Family Medicine Resident

## 2019-10-29 NOTE — PATIENT INSTRUCTIONS
Semanas 34 a 36 de holloway embarazo: Instrucciones de cuidado - [ Bartolo Sladeher 34 to 39 of Your Pregnancy: Care Instructions ]  Instrucciones de cuidado    A estas [place], holloway bebé y holloway abdomen habrán crecido considerablemente. Alla es [place] de ludwig a olivia. Los pulmones de holloway bebé están alla listos para respirar aire. Los huesos de la qasim de holloway bebé ahora son bastante firmes adriano para protegerla georgette se mantienen lo suficientemente blandos adriano para atravesar el canal de [place]. Es posible que sienta entusiasmo, amber, ansiedad o miedo. Quizá se pregunte cómo se dará cuenta de si está en trabajo de parto o qué esperar en jabier momento. Trate de ser flexible con estefania expectativas respecto del nacimiento. Dado que cada nacimiento es diferente, no hay manera de saber exactamente cómo será holloway parto. Esta hoja de cuidados la ayudará a saber qué esperar y cómo prepararse. Le podría facilitar el parto. Si todavía no le olmedo aplicado la vacuna Tdap (tétanos, difteria y tos [place]) shamir [facility], hable con holloway médico acerca de aplicársela. Mian Patron a proteger a holloway recién nacido contra la infección por tos ferina. En la semana 36, a la mayoría de las mujeres se les hace manisha prueba de estreptococos del debbi B (GBS, por estefania siglas en inglés). Los estreptococos del debbi B son bacterias comunes que pueden vivir en la vagina y el recto. Pueden hacer que holloway bebé se enferme después del parto. Si el resultado es positivo, usted recibirá antibióticos shamir el trabajo de [place]. Los medicamentos evitarán que holloway bebé contraiga las bacterias. La atención de seguimiento es manisha parte clave de holloway tratamiento y seguridad. Asegúrese de hacer y acudir a todas las citas, y llame a holloway médico si está teniendo problemas. También es manisha buena idea saber los resultados de estefania exámenes y mantener manisha lista de los medicamentos que pablito. ¿Cómo puede cuidarse en el hogar?   Aprenda sobre las alternativas para aliviar el dolor  · El dolor se manifiesta de modo diferente en cada [place]. Hable con holloway médico acerca de estefania sentimientos sobre el dolor. · Puede elegir entre varias formas de aliviar el dolor. Estas incluyen medicamentos o técnicas de respiración, así adriano medidas para estar cómoda. Usted puede utilizar más de Ally Confucianist opción. · Si elige un analgésico (medicamento para el dolor) shamir el trabajo de [place], hable con holloway médico acerca de estefania opciones. Algunos medicamentos reducen la ansiedad y Nepali [facility] a aliviar parte del dolor. Otros adormecen la parte inferior del cuerpo para que no sienta dolor. · Asegúrese de decirle a holloway médico acerca de holloway elección de analgésico antes de empezar el trabajo de parto o muy temprano en el [place]. Es posible que pueda cambiar de parecer a medida que avanza el [place]. · Belén vez se duerme a manisha [place] con medicamentos administrados a través de manisha máscara o por vía intravenosa (IV). Trabajo de parto y [place]  · La primera etapa del [place] de parto se divide en renato fases: Beau Hock y de transición. ? La mayoría de las mujeres experimentan la fase latente del [place] de parto en estefania hogares. Usted puede [employer] o descansar, comer refrigerios livianos, beber líquidos nataly y comenzar a contar las contracciones. ? Cuando advierta que se le vuelve difícil hablar shamir manisha contracción, es posible que esté por pasar a la fase activa. Shamir la fase Ru Haagensen, debería ir al hospital si no está allí aún. ? Usted está en la fase activa cuando tiene contracciones cada 3 o 4 minutos y love alrededor de 60 segundos. El david uterino comienza a abrirse con más rapidez.  ? Si se le rompe la rosita, las contracciones serán más intensas y más frecuentes. ? Shamir la fase de transición, el david uterino se estira y las contracciones se producen con Maty Ba. ? Edith Crenshaw tenga deseos de pujar, sin embargo es posible que el david uterino aún no esté preparado.  El [employer] dirá cuándo pujar. · La segunda etapa comienza cuando el david uterino se abre por completo y usted está lista para pujar. ? Las contracciones son muy intensas a fin de empujar al bebé por el canal de parto. ? Sentirá la necesidad de pujar. Podría sentir adriano si tuviera ganas de evacuar el intestino. ? Kathleene Deedee entrenen a [employer]. Estas contracciones serán muy intensas georgette no ocurrirán con tanta frecuencia. Puede descansar un poco entre contracciones. ? Es posible que esté sensible e irritable. Es posible que no se dé cuenta de lo que pasa a holloway alrededor. ? Un último esfuerzo y habrá nacido holloway bebé. · La tercera etapa ocurre cuando con unas cuantas contracciones más se expulsa la placenta. [place] puede durar 30 minutos o menos. · La cuarta etapa es la de recuperación. Es posible que se sienta abrumada con las emociones y exhausta georgette alerta. Michelle es un buen momento para comenzar el amamantamiento. ¿Dónde puede encontrar más información en inglés? Jagruti Frank a http://al-lyndsey.info/. Ary Bush [id number] en la búsqueda para aprender más acerca de \"Semanas 34 a 39 de holloway embarazo: Instrucciones de cuidado - [ Xavier Sol 34 to 39 of Your Pregnancy: Care Instructions ]. \"  Revisado: 29 crisostomo, 2019  Versión del contenido: 12.2  © 6153-5436 Healthwise, Incorporated. Las instrucciones de cuidado fueron adaptadas bajo licencia por [employer] (which disclaims liability or warranty for this information). Si usted tiene [place] afección médica o sobre estas instrucciones, siempre pregunte a holloway profesional de arvin. Healthwise, Incorporated niega toda garantía o responsabilidad por holloway uso de esta información.       Conteo de las patadas de holloway bebé: Instrucciones de cuidado - [ Letty Akbar Your [de-identified] Marcio: Care Instructions ]  Instrucciones de cuidado    Contar las patadas de holloway bebé es manisha manera en la que holloway médico puede establecer si holloway bebé es saludable.  [address] las mujeres, sobre todo en el primer embarazo, siente que holloway bebé se mueve por primera vez entre las semanas 12 y 25. El movimiento puede sentirse más adriano aleteos que adriano patadas. Es posible que holloway bebé se mueva más a ciertas horas del día. Cuando usted [place], puede notar menos patadas que cuando está descansando. En estefania visitas prenatales, holloway médico le preguntará si el bebé está activo. En el último trimestre, holloway médico le puede pedir que cuente la cantidad de veces que sienta que el bebé se [place]. La atención de seguimiento es manisha parte clave de holloway tratamiento y seguridad. Asegúrese de hacer y acudir a todas las citas, y llame a holloway médico si está teniendo problemas. También es manisha buena idea saber los resultados de estefania exámenes y mantener manisha lista de los medicamentos que pablito. ¿Cómo se cuentan las patadas fetales? · Un método común para revisar el movimiento de holloway bebé es contar la cantidad de patadas o movimientos que sienta en 1 hora. Es normal sentir 10 movimientos (adriano patadas, aleteos o vueltas) en 1 hora. Algunos médicos sugieren que cuente shamir la [facility] llegar a los 10 movimientos. Luego usted puede dejar de contar por jabier día y comenzar otra vez al día siguiente. · Para contar, elija el momento del día en que holloway bebé esté más activo. Podría ser cualquier momento entre la mañana y la noche. · Si no siente 10 movimientos en manisha hora, es posible que holloway bebé esté durmiendo. Espere hasta la próxima hora y vuelva a contar. ¿Cuándo debe pedir ayuda? Llame a holloway médico ahora mismo o busque atención médica inmediata si:    · Siente que holloway bebé ha dejado de moverse o se mueve mucho menos de lo normal.    Preste especial atención a los cambios en holloway arvin y asegúrese de comunicarse con holloway médico si tiene cualquier problema. ¿Dónde puede encontrar más información en inglés? Coco Clifford a http://al-lyndsey.info/.   Musa Odell en la búsqueda para aprender Cathleen zepeda \"Conteo de las patadas de holloway bebé: Instrucciones de cuidado - [ Counting Your Baby's Kicks: Care Instructions ]. \"  Revisado: 29 mayo, 2019  Versión del contenido: 12.2  © 7245-7001 Healthwise, Incorporated. Las instrucciones de cuidado fueron adaptadas bajo licencia por [employer] (which disclaims liability or warranty for this information). Si usted tiene [place] afección médica o sobre estas instrucciones, siempre pregunte a holloway profesional de arvin.  [employer] niega toda garantía o responsabilidad por holloway uso de esta información.

## 2019-11-03 LAB
B-HEM STREP SPEC QL CULT: NEGATIVE
GRBS, EXTERNAL: NEGATIVE

## 2019-11-10 ENCOUNTER — HOSPITAL ENCOUNTER (EMERGENCY)
Age: 34
Discharge: HOME | End: 2019-11-10
Attending: EMERGENCY MEDICINE | Admitting: FAMILY MEDICINE
Payer: SELF-PAY

## 2019-11-10 VITALS
DIASTOLIC BLOOD PRESSURE: 60 MMHG | HEART RATE: 88 BPM | HEIGHT: 59 IN | WEIGHT: 136 LBS | BODY MASS INDEX: 27.42 KG/M2 | RESPIRATION RATE: 14 BRPM | TEMPERATURE: 98.7 F | SYSTOLIC BLOOD PRESSURE: 104 MMHG

## 2019-11-10 LAB
APPEARANCE UR: CLEAR
BACTERIA URNS QL MICRO: ABNORMAL /HPF
BILIRUB UR QL: NEGATIVE
COLOR UR: ABNORMAL
EPITH CASTS URNS QL MICRO: ABNORMAL /LPF
GLUCOSE UR STRIP.AUTO-MCNC: NEGATIVE MG/DL
HGB UR QL STRIP: NEGATIVE
HYALINE CASTS URNS QL MICRO: ABNORMAL /LPF (ref 0–5)
KETONES UR QL STRIP.AUTO: NEGATIVE MG/DL
LEUKOCYTE ESTERASE UR QL STRIP.AUTO: ABNORMAL
NITRITE UR QL STRIP.AUTO: NEGATIVE
PH UR STRIP: 7.5 [PH] (ref 5–8)
PROT UR STRIP-MCNC: NEGATIVE MG/DL
RBC #/AREA URNS HPF: ABNORMAL /HPF (ref 0–5)
SP GR UR REFRACTOMETRY: 1.01 (ref 1–1.03)
UR CULT HOLD, URHOLD: NORMAL
UROBILINOGEN UR QL STRIP.AUTO: 0.2 EU/DL (ref 0.2–1)
WBC URNS QL MICRO: ABNORMAL /HPF (ref 0–4)

## 2019-11-10 PROCEDURE — 96365 THER/PROPH/DIAG IV INF INIT: CPT

## 2019-11-10 PROCEDURE — 59025 FETAL NON-STRESS TEST: CPT

## 2019-11-10 PROCEDURE — 81001 URINALYSIS AUTO W/SCOPE: CPT

## 2019-11-10 PROCEDURE — 99284 EMERGENCY DEPT VISIT MOD MDM: CPT

## 2019-11-10 PROCEDURE — 74011250636 HC RX REV CODE- 250/636: Performed by: OBSTETRICS & GYNECOLOGY

## 2019-11-10 PROCEDURE — 99282 EMERGENCY DEPT VISIT SF MDM: CPT

## 2019-11-10 PROCEDURE — 96360 HYDRATION IV INFUSION INIT: CPT

## 2019-11-10 RX ADMIN — SODIUM CHLORIDE, SODIUM LACTATE, POTASSIUM CHLORIDE, AND CALCIUM CHLORIDE 1000 ML: 600; 310; 30; 20 INJECTION, SOLUTION INTRAVENOUS at 16:25

## 2019-11-10 NOTE — PROGRESS NOTES
[address] to Dr Harsha Elam regarding pts ctx pattern of every 1.5/3mins. Aware pt states her pain is 8/10. Orders taken for pt to receive 1 l bolus.

## 2019-11-10 NOTE — PROGRESS NOTES
1315-Resident notified pt here and c/o abdominal pain and pain when urinating. Resident states she is coming to unit to see patient. 1517-Resident reviewed strip and ua results, stating she is coming over for cervical check  1524-Dr. Verner Golds at bedside for cervical check, no change in cervix, explains to patient cervix has not changed, pt verbalized understanding. MD states she will speak with Dr. Nohemi Castellon and develop plan of care. 1530-Rito Mera at bedside, explains Dr. Nohemi Castellon orders for patient to be discharged home and to come back if pain gets worse or water breaks, and to keep follow up appointment for Tuesday with Dr. Briana Thomas. Pt verbalizes understanding. 1610-Charge nurse, Ne Richey, RN spoke with Dr. Nohemi Castellon about EFM tracing and pt aaron 1.5-3 mins and rates pain 8/10. MD ok with pt receiving 1 liter IV bolus  1620-Explained to patient and pt sister thatnew POC is to start an IV and give a liter of IV fluids to try and space out the contractions which may make them less painful. Pt verbalizes understanding and agrees to plan  1700-IV fluids complete, pt has voided, states she still feels the contraction pain. RN states she will call MD to see what the plan will be. Pt verbalizes understanding.  used. 1715-Called report to Dr. Nohemi Castellon, pt contractions spaced out to 2-7 mins, FHR reactive, MD reviewed strip while on phone, states pt may be discharged home, cervix does not need to be rechecked. 1728-Pt IV infusion complete, pt requests to go to bathroom  1735-RN at bedside,  phone used, Yolanda Yo,  number [id number]\" pt instructed that plan of care at this time is to discharge home with follow up on Tuesday @ 1100 am with Dr. Briana Thomas as previously scheduled. Pt verbalizes understanding and states she feels better now that contractions have spaced out. Pt asks if contractions will go completely away or get worse.  RN explains that contractions may continue but while at the hospital they were not strong enough to cause cervical change. If contractions or pain becomes worse, pt should call MD and come back to be re-evaluated to see if she is in labor. Also, kick counts discussed and some spotting is normal because of cervical checks. Pt verbalizes understanding and denies any further questions. Pt states she would like to stay in the room until her sister gets to hospital to pick her up. RN states that is ok. Pt instructed to call out on call bell if her sister has any questions about discharge. Pt verbalizes understanding. Call ended at [phone].

## 2019-11-10 NOTE — DISCHARGE SUMMARY
[facility] [address] [place] [address]   Office [phone], Fax [phone]      Antepartum Discharge Summary     Name: Basia Hathaway MRN: [id number]  SSN: [id number]    YOB: 1985  Age: 29 y.o. Sex: female      Admit Date: 11/10/2019    Discharge Date: 11/10/2019     Admitting Physician: Jai Gar MD    Attending Physician:  Jai Gar MD    Attending Physician at discharge: Jai Gar MD    Admission Diagnoses: Pregnancy and labor rule out    Discharge Diagnoses:  Pregnancy    Immunization(s):   There is no immunization history on file for this patient. [facility]:    Ms. Jada Hu is a 29 y.o.   female with an estimated gestational age of 42w0d with Estimated Date of Delivery: 19. Patient presented with low abdominal pain in setting of 4th pregnancy. Patient denied contractions, LOF, bleeding, as well as constitutional symptoms. Patient was monitored for 2 hours, Donovan consistent with irregular contractions, [address] [place] reactive BL of 135. Cervical exam was unchanged since presentation. Urinalysis was done, showed +1 LE, patient is asymptomatic. During encounter for discharge LnD precautions given, signs of labor discussed, course of actions explained. Patient expresses understanding. Next office visit on Tuesday with Dr Dea Mondragon. Condition at Discharge: stable    Patient Instructions:   Current Discharge Medication List      CONTINUE these medications which have NOT CHANGED    Details   PNV No12-Iron-FA-DSS-OM-3 29 mg iron-1 mg -50 mg CPKD Take  by mouth. Reference my discharge instructions.     Follow-up Information    None         Signed By:  Roselyn Matamoros MD    Family Medicine Resident

## 2019-11-10 NOTE — ED NOTES
12:25 PM  I have just evaluated the patient. I have reviewed Her vital signs and determined there is currently no worsening in their condition or physical exam. I have talked with the patient and the family and advised them that I am the provider in triage and have ordered lab work, x rays and other diagnostic tests. I have advised them that we will try and get them to the back as soon as possible. I have also advised them that should they have a worsening condition or any problems before they are sent back to the main ED, to contact me or the triage nurse. Initial Complaint: Pain in abd    Started: Started yesterday morning. Due November 24, 2019    Endorses: 2 minutes apart. No fetal movement since last night. Denies: water breaking, loss of mucous plug. No further complaints. Orders placed. Primary care provider: No primary care provider on file.     Jean Richey, NP

## 2019-11-10 NOTE — H&P
[address] [facility] [address] [place] [address]   Office [phone], Fax [phone]      History & Physical    Name: Alexandrea Valdez MRN: [id number]  SSN: [id number]    YOB: 1985  Age: 29 y.o. Sex: female      Subjective:     [facility]#[id number]    Charts need to be merged: [id number]    Reason for Admission:  Pregnancy and labor rule out. History of Present Illness: Ms. Obed Halsted is a 29 y.o.   female with an estimated gestational age of 42w0d with Estimated Date of Delivery: 19. Patient complains of severe abdominal pain for 1 days. Pain is 8/10 localized to lower abd on midline. Pregnancy has been complicated by late to present for care, anemia treated with iron, GERS, abnormal pap. . Patient denies chest pain, contractions, fever, headache , nausea and vomiting, right upper quadrant pain  , shortness of breath, vaginal bleeding , vaginal leaking of fluid  and visual disturbances. Previous pregnancies: uncompleted SVDs. Oldest was born outside of [place], younger as born here. OB History    Para Term  AB Living   4 3 2 1       SAB TAB Ectopic Molar Multiple Live Births                    # Outcome Date GA Lbr John/2nd Weight Sex Delivery Anes PTL Lv   4 Current            3             2 Term            1 Term              No past medical history on file. No past surgical history on file. Social History     Occupational History    Not on file   Tobacco Use    Smoking status: Never Smoker    Smokeless tobacco: Never Used   Substance and Sexual Activity    Alcohol use: Not Currently     Frequency: Never    Drug use: Never    Sexual activity: Not on file      No family history on file. No Known Allergies  Prior to Admission medications    Medication Sig Start Date End Date Taking? Authorizing Provider   PNV No12-Iron-FA-DSS-OM-3 29 mg iron-1 mg -50 mg CPKD Take  by mouth.    Yes Provider, Historical        Review of Systems:  A comprehensive review of systems was negative except for that written in the History of Present Illness. Objective:     Vitals:    Vitals:    11/10/19 1256   Weight: 61.7 kg (136 lb)   Height: 4' 11\" (1.499 m)      No data recorded. No data recorded     Physical Exam:  Patient without distress. Heart: Regular rate and rhythm, S1S2 present or without murmur or extra heart sounds  Lung: clear to auscultation throughout lung fields, no wheezes, no rales, no rhonchi and normal respiratory effort  Abdomen: soft, nontender  Fundus: soft and non tender  Perineum: blood absent,   Cervical Exam: 4 cm dilated    50% effaced    Cervical Position: posterior  Consistency: Medium  Lower Extremities:  - Edema No     Membranes:  Intact  Uterine Activity:  Frequency: irregular  1-3 min  Fetal Heart Rate:  Baseline: 140 per minute, mod variab       Lab/Data Review:  No results found for this or any previous visit (from the past 24 hour(s)). Assessment and Plan:     Ms. Letty Beauchamp is a 29 y.o.   female with an estimated gestational age of 42w0d who is hre for abdominal pain and admitted for labor rule out. · Rule out labor: SIUP: RH +. Normal PNL. 1hr GTT normal. Late to care. Cephalic presentation confirmed by ultrasound on 10/29/19.  - Likely Chi-Freed[place] Cxs - will monitor with NST for now  - UA ordered to rule out   - S/p Influenza and Tdap  - GBS neg   - Anemia: 10.5 at 33 weeks on iron supplementation - compliant   - H/o GERD. Was on Pepcid 10mg BID.  Not taking now    Patient discussed  with Dr. Demi Batista (OB-hospitalist)    Jose Guerra MD  Family Medicine Resident

## 2019-11-10 NOTE — DISCHARGE INSTRUCTIONS
Patient Education   Patient Education     Keep appointment for Tuesday, November 12, @ 1100 am with Dr. Valencia Adorno. Come back to Labor and Delivery if contraction pain becomes worse than the pain during your hospital visit today. Bloody show/discharge is normal to see for a couple of days because of your cervical exam. Please call your OB doctor with any concerns or if you feel that you need to come back to the hospital.       Semana 38 de holloway embarazo: Instrucciones de cuidado - [ Week 45 of Your Pregnancy: Care Instructions ]  Instrucciones de cuidado    Aunque no lo crea, holloway bebé está por nacer. Es posible que ya tenga ideas acerca de la personalidad de holloway bebé debido a cuánto se mueve. O mike vez haya notado cómo responde a los sonidos, al calor, al frío y a la olivia. Incluso podría saber qué tipo de [place] a holloway bebé. A estas [place], usted tiene [address] idea de qué puede esperar shamir el [place]. Es posible que haya hablado de estefania preferencias del nacimiento con holloway médico. Yeny incluso si usted quiere un nacimiento por vía vaginal, es manisha buena idea aprender [employer] nacimientos por cesárea. Los partos por cesárea son [employer] bebés nacen por medio de un claudia (incisión) hecho en la parte inferior del abdomen. A veces, es la mejor opción para la arvin del bebé y de [place]. Esta hoja de cuidados puede ayudarla a entender los nacimientos por cesárea. También le da información sobre qué esperar después del nacimiento de holloway bebé. Y la ayuda a comprender [employer] depresión posparto. La atención de seguimiento es manisha parte clave de holloway tratamiento y seguridad. Asegúrese de hacer y acudir a todas las citas, y llame a holloway médico si está teniendo problemas. También es manisha buena idea saber los resultados de estefania exámenes y mantener manisha lista de los medicamentos que pablito. ¿Cómo puede cuidarse en el hogar?   Aprenda sobre el parto por cesárea  · La mayoría de los partos por cesárea no están planeados. Se hacen por problemas que se producen shamir el trabajo de [place]. Estos problemas podrían incluir:  ? El [place] de parto se vuelve más lento o se detiene. ? Presión arterial carlo u otros problemas para la madre.  ? Señales de sufrimiento del bebé. Estas señales pueden incluir manisha frecuencia cardíaca muy rápida o lenta. · [place] y los bebés están andrew después de un parto por cesárea, la cesárea es manisha [place]. Tiene más riesgos que un parto vaginal.  · En algunos casos, un parto por cesárea planificado puede ser más seguro que un parto vaginal. [place] puede ser el daniela si:  ? La madre tiene un problema de arvin, adriano manisha afección cardíaca. ? El bebé no está en posición con la qasim para abajo para el parto. Esta posición se llama de nalgas. ? El útero tiene cicatrices de cirugías anteriores. [place] podría aumentar la probabilidad de un desgarro en el [place]. ? Hay un problema con la placenta. ? La madre tiene manisha infección, adriano herpes genital, que podría transmitirse al bebé. ? La madre está embarazada con mellizos o más bebés. ? El bebé pesa de 9 a 8 libras o más. · Debido a los riesgos del parto por cesárea, las cesáreas planeadas deberían hacerse generalmente solo por motivos médicos. Y manisha cesárea planeada debería hacerse en la semana [id number] o más tarde flavio que haya un motivo médico para hacerla antes. Sepa lo que ocurrirá después del parto y cómo planificar las primeras semanas en holloway hogar  · Usted, holloway bebé y holloway jo o instructor Isabela Ma bandas de identificación. Solo las personas que tengan bandas que coincidan podrán retirar al bebé de la mamadou de recién nacidos. · Aprenderá a alimentar a holloway bebé, cambiar el pañal y bañar al bebé. [place] a cuidar del muñón del cordón umbilical. Si Rafal Ang a circuncidar a holloway bebé, también aprenderá a cuidar manisha circuncisión. · Pídale a las visitas que esperen a visitarla cuando esté en holloway hogar.  Y pídales que se [place] vera antes de tocar al bebé. · Asegúrese de tener otro adulto en casa por lo menos 2 o 3 días después del [place]. · Jennifer las primeras 2 semanas, limite las visitas de familiares y amigos. · No permita visitantes que tengan resfriados o infecciones. Asegúrese de que todos los visitantes estén al día con estefania vacunas. Nunca deje que nadie fume cerca de holloway bebé. · Trate de dormir manisha siesta cuando holloway bebé duerma. Sea consciente de la depresión posparto  · La \"melancolía de la maternidad\" es común en las primeras 1 o 2 semanas después del [place]. Es posible que tenga ganas de llorar o se sienta steve o irritable sin motivo alguno. · En algunas mujeres, estas emociones love más tiempo y son más intensas. A esto se lo conoce adriano depresión posparto. · Si estefania síntomas love más de unas pocas semanas, o si se siente muy deprimida, pídale ayuda a holloway médico.  · La depresión posparto sí puede tratarse. Los grupos de apoyo y la asesoría psicológica pueden ser de [facility]. A veces, los medicamentos también pueden ayudar. ¿Dónde puede encontrar más información en inglés? Coco Clifford a http://al-lyndsey.info/. Escriba B044 en la búsqueda para aprender más acerca de \"Semana 45 de holloway embarazo: Instrucciones de cuidado - [ Week 45 of Your Pregnancy: Care Instructions ]. \"  Revisado: 29 mayo, 2019  Versión del contenido: 12.2  © 4281-1600 Healthwise, Incorporated. Las instrucciones de cuidado fueron adaptadas bajo licencia por [employer]ctions (which disclaims liability or warranty for this information). Si usted tiene [place] afección médica o sobre estas instrucciones, siempre pregunte a holloway profesional de arvin. [facility] [employer] niega toda garantía o responsabilidad por holloway uso de esta información. Conteo de las patadas de holloway bebé:  Instrucciones de cuidado - [ [place]on [place] Your [de-identified] Marcio: Care Instructions ]  Instrucciones de cuidado    Contar las patadas de holloway bebé es Michael Echols manera en la que holloway médico puede establecer si holloway bebé es saludable. La mayoría de las [facility], sobre todo en el primer embarazo, siente que holloway bebé se mueve por primera vez entre las semanas 12 y 25. El movimiento puede sentirse más adriano aleteos que adriano patadas. Es posible que holloway bebé se mueva más a ciertas horas del día. Cuando usted [place], puede notar menos patadas que cuando está descansando. En estefania visitas prenatales, holloway médico le preguntará si el bebé está activo. En el último trimestre, holloway médico le puede pedir que cuente la cantidad de veces que sienta que el bebé se [place]. La atención de seguimiento es manisha parte clave de holloway tratamiento y seguridad. Asegúrese de hacer y acudir a todas las citas, y llame a holloway médico si está teniendo problemas. También es manisha buena idea saber los resultados de estefania exámenes y mantener manisha lista de los medicamentos que pablito. ¿Cómo se cuentan las patadas fetales? · Un método común para revisar el movimiento de holloway bebé es contar la cantidad de patadas o movimientos que sienta en 1 hora. Es normal sentir 10 movimientos (adriano patadas, aleteos o vueltas) en 1 hora. Algunos médicos sugieren que cuente shamir la [facility] llegar a los 10 movimientos. Luego usmai puede dejar de contar por jabier día y comenzar otra vez al día siguiente. · Para contar, elija el momento del día en que holloway bebé esté más activo. Podría ser cualquier momento entre la mañana y la noche. · Si no siente 10 movimientos en manisha hora, es posible que holloway bebé esté durmiendo. Espere hasta la próxima hora y vuelva a contar. ¿Cuándo debe pedir ayuda? Llame a holloway médico ahora mismo o busque atención médica inmediata si:    · Siente que holloway bebé ha dejado de moverse o se mueve mucho menos de lo normal.    Preste especial atención a los cambios en holloway arvin y asegúrese de comunicarse con holloway médico si tiene cualquier problema. ¿Dónde puede encontrar más información en inglés?   Nicolle Spurling a http://al-lyndsey.info/. [place]ry [place] [id number] en la búsqueda para aprender más acerca de \"Conteo de las patadas de holloway bebé: Instrucciones de cuidado - [ Counting Your Baby's Kicks: Care Instructions ]. \"  Revisado: 29 mayo, 2019  Versión del contenido: 12.2  © 9503-3023 Healthwise, Incorporated. Las instrucciones de cuidado fueron adaptadas bajo licencia por [facility] Connections (which disclaims liability or warranty for this information). Si usted tiene [place] afección médica o sobre estas instrucciones, siempre pregunte a holloway profesional de arvin. Healthwise, Incorporated niega toda garantía o responsabilidad por holloway uso de esta información.

## 2019-11-12 ENCOUNTER — ROUTINE PRENATAL (OUTPATIENT)
Dept: FAMILY MEDICINE CLINIC | Age: 34
End: 2019-11-12

## 2019-11-12 VITALS
RESPIRATION RATE: 18 BRPM | TEMPERATURE: 96.7 F | WEIGHT: 141 LBS | BODY MASS INDEX: 28.43 KG/M2 | HEIGHT: 59 IN | OXYGEN SATURATION: 99 % | SYSTOLIC BLOOD PRESSURE: 107 MMHG | DIASTOLIC BLOOD PRESSURE: 63 MMHG | HEART RATE: 72 BPM

## 2019-11-12 DIAGNOSIS — Z34.90 PREGNANCY, UNSPECIFIED GESTATIONAL AGE: Primary | ICD-10-CM

## 2019-11-12 LAB
BILIRUB UR QL STRIP: NEGATIVE
GLUCOSE UR-MCNC: NEGATIVE MG/DL
KETONES P FAST UR STRIP-MCNC: NEGATIVE MG/DL
PH UR STRIP: 7.5 [PH] (ref 4.6–8)
PROT UR QL STRIP: NEGATIVE
SP GR UR STRIP: 1.02 (ref 1–1.03)
UA UROBILINOGEN AMB POC: NORMAL (ref 0.2–1)
URINALYSIS CLARITY POC: CLEAR
URINALYSIS COLOR POC: YELLOW
URINE BLOOD POC: NEGATIVE
URINE LEUKOCYTES POC: NEGATIVE
URINE NITRITES POC: NEGATIVE

## 2019-11-12 NOTE — PROGRESS NOTES
Sarah Parkinson is a 29 y.o. female    Chief Complaint   Patient presents with    Routine Prenatal Visit       1. Have you been to the ER, urgent care clinic since your last visit? Hospitalized since your last visit?11/10/19 [facility] , ABD pain  M  2. Have you seen or consulted any other health care providers outside of the [address] [place] [address] since your last visit? Include any pap smears or colon screening. No      Visit Vitals  /63 (BP 1 Location: Right arm, BP Patient Position: Sitting)   Pulse 72   Temp 96.7 °F (35.9 °C) (Oral)   Resp 18   Ht 4' 11\" (1.499 m)   Wt 141 lb (64 kg)   SpO2 99%   BMI 28.48 kg/m²           There are no preventive care reminders to display for this patient. Medication Reconciliation completed, changes noted.   Please  Update medication list.

## 2019-11-12 NOTE — PATIENT INSTRUCTIONS
Conteo de las patadas de holloway bebé: Instrucciones de cuidado - [ Inetta Pacini Your [de-identified] Kicks: Care Instructions ]  Instrucciones de cuidado    Contar las patadas de holloway bebé es manisha manera en la que holloway médico puede establecer si holloway bebé es saludable. La mayoría de las [facility], sobre todo en el primer embarazo, siente que holloway bebé se mueve por primera vez entre las semanas 12 y 25. El movimiento puede sentirse más adriano aleteos que adriano patadas. Es posible que holloway bebé se mueva más a ciertas horas del día. Cuando usmai [place], puede notar menos patadas que cuando está descansando. En estefania visitas prenatales, holloway médico le preguntará si el bebé está activo. En el último trimestre, holloway médico le puede pedir que cuente la cantidad de veces que sienta que el bebé se [place]. La atención de seguimiento es manisha parte clave de holloway tratamiento y seguridad. Asegúrese de hacer y acudir a todas las citas, y llame a holloway médico si está teniendo problemas. También es manisha buena idea saber los resultados de estefania exámenes y mantener manisha lista de los medicamentos que pablito. ¿Cómo se cuentan las patadas fetales? · Un método común para revisar el movimiento de holloway bebé es contar la cantidad de patadas o movimientos que sienta en 1 hora. Es normal sentir 10 movimientos (adriano patadas, aleteos o vueltas) en 1 hora. Algunos médicos sugieren que cuente shamir la [facility] llegar a los 10 movimientos. Luego usted puede dejar de contar por jabier día y comenzar otra vez al día siguiente. · Para contar, elija el momento del día en que holloway bebé esté más activo. Podría ser cualquier momento entre la mañana y la noche. · Si no siente 10 movimientos en manisha hora, es posible que holloway bebé esté durmiendo. Espere hasta la próxima hora y vuelva a contar. ¿Cuándo debe pedir ayuda?   Llame a holloway médico ahora mismo o busque atención médica inmediata si:    · Siente que holloway bebé ha dejado de moverse o se mueve mucho menos de lo normal.    Preste especial atención a los cambios en holloway arvin y asegúrese de comunicarse con holloway médico si tiene cualquier problema. ¿Dónde puede encontrar más información en inglés? Foster Bojorquez a http://al-lyndsey.info/. Ginger Cassie [id number] en la búsqueda para aprender más acerca de \"Conteo de las patadas de holloway bebé: Instrucciones de cuidado - [ Counting Your Baby's Kicks: Care Instructions ]. \"  Revisado: 29 mayo, 2019  Versión del contenido: 12.2  © 8801-7242 [employer]. Las instrucciones de cuidado fueron adaptadas bajo licencia por Good [employer] Connections (which disclaims liability or warranty for this information). Si usted tiene [place] afección médica o sobre estas instrucciones, siempre pregunte a holloway profesional de arvin.  [employer] niega toda garantía o responsabilidad por holloway uso de esta información.

## 2019-11-12 NOTE — PROGRESS NOTES
Return OB Visit         Subjective:   Avelino Buck 29 y.o. [place]   MICHAEL: 11/24/2019, Date entered prior to episode creation at 36w4d. Snappli  used for Thai translation    Recently in L&D (11/10/19) for observation 2/2 decreased FM and abdominal pain. Today, +FM. No LOF, VB, or CTX. Does have pelvic pressure occasionally but no true contractions. Allergies- reviewed:   No Known Allergies  Medications- reviewed:   Current Outpatient Medications   Medication Sig    PNV No12-Iron-FA-DSS-OM-3 29 mg iron-1 mg -50 mg CPKD Take  by mouth.  famotidine (PEPCID) 10 mg tablet Take 1 Tab by mouth two (2) times a day.  ferrous sulfate (IRON) 325 mg (65 mg iron) EC tablet Take 1 Tab by mouth Daily (before breakfast).  prenatal vit-calcium-iron-fa (PRENATAL PLUS WITH CALCIUM) 27 mg iron- 1 mg tab Take 1 Tab by mouth daily. No current facility-administered medications for this visit. Past Medical History- reviewed:  No past medical history on file. Past Surgical History- reviewed:   No past surgical history on file.   Social History- reviewed:  Social History     Socioeconomic History    Marital status: SINGLE     Spouse name: Not on file    Number of children: Not on file    Years of education: Not on file    Highest education level: Not on file   Occupational History    Not on file   Social Needs    Financial resource strain: Not on file    Food insecurity:     Worry: Not on file     Inability: Not on file    Transportation needs:     Medical: Not on file     Non-medical: Not on file   Tobacco Use    Smoking status: Never Smoker    Smokeless tobacco: Never Used   Substance and Sexual Activity    Alcohol use: Not Currently     Frequency: Never    Drug use: Never    Sexual activity: Yes   Lifestyle    Physical activity:     Days per week: Not on file     Minutes per session: Not on file    Stress: Not on file   Relationships    Social connections:     Talks on phone: Not on file     Gets together: Not on file     Attends [employer] service: Not on file     Active member of club or organization: Not on file     Attends meetings of clubs or organizations: Not on file     Relationship status: Not on file    Intimate partner violence:     Fear of current or ex partner: Not on file     Emotionally abused: Not on file     Physically abused: Not on file     Forced sexual activity: Not on file   Other Topics Concern     Service Not Asked    Blood Transfusions Not Asked    Caffeine Concern Not Asked    Occupational Exposure Not Asked   Nick Lyons Hazards Not Asked    Sleep Concern Not Asked    Stress Concern Not Asked    Weight Concern Not Asked    Special Diet Not Asked    Back Care Not Asked    Exercise Not Asked    Bike Helmet Not Asked   [place] Not Asked    Self-Exams Not Asked   Social History Narrative    ** Merged History Encounter **          Immunizations- reviewed:   Immunization History   Administered Date(s) Administered    Influenza Vaccine (Quad) PF 2019    Tdap 2019         Objective:     Visit Vitals  LMP 2019 (Exact Date)       Physical Exam:  GENERAL APPEARANCE: alert, well appearing, in no apparent distress  ABDOMEN: gravid, fundal height 38 cm, FHT present at 140bpm  PSYCH: normal mood and affect  SVE: 50/-2 (unconfirmed)  Exam chaperoned by Nini Cloud LPN      Labs  No results found for this or any previous visit (from the past 12 hour(s)). Assessment         ICD-10-CM ICD-9-CM    1. Pregnancy, unspecified gestational age Z27.80 V22.2          Plan   29 y.o.  at 36w4d  by second trimester US, MICHAEL 2019,   · SIUP  · RH +. Normal PNL. 1hr GTT normal.  · Declined genetic Screening  · S/p dating/anatomy scan on 19. Female fetus. Anterior placenta  · S/p Influenza and Tdap  · UA unremarkable. · RTC in one week  · Abnormal pap: ASCUS and HPV positive.  Patient request to have it deferred until 6 wks PP  · Late to care. Initial OB at 29 weeks  · Anemia: 10.5 at 33 weeks on iron supplementation   · Breech presentation @29wks. Cephalic presentation confirmed by ultrasound on 10/29/19. · GERD. Was on Pepcid 10mg BID. Now resolved. · Counseled patient on kick counts. Should have 3 kicks/hour or 10 kicks every 2 hours. ED precautions given to patient if baby moves less than the kick counts. No orders of the defined types were placed in this encounter. Labor precautions discussed, including: Regular painful contractions, lasting for greater than one hour, taking your breath away; any vaginal bleeding; any leakage of fluid; or absent or decreased fetal movement. Call M.D. on call if any of these symptoms or signs occur. I have discussed the diagnosis with the patient and the intended plan as seen in the above orders. The patient has received an after-visit summary and questions were answered concerning future plans. I have discussed medication side effects and warnings with the patient as well. Informed pt to return to the office or go to the ER if she experiences vaginal bleeding, vaginal discharge, leaking of fluid, pelvic cramping.     Pt discussed with Dr. Lexy Dutton (attending physician)    Sara Erazo MD  Family Medicine Resident

## 2019-11-15 ENCOUNTER — HOSPITAL ENCOUNTER (INPATIENT)
Age: 34
LOS: 1 days | Discharge: HOME | End: 2019-11-16
Attending: FAMILY MEDICINE | Admitting: OBSTETRICS & GYNECOLOGY
Payer: SELF-PAY

## 2019-11-15 PROBLEM — Z34.90 PREGNANCY: Status: ACTIVE | Noted: 2019-11-15

## 2019-11-15 LAB
BASOPHILS # BLD: 0.1 K/UL (ref 0–0.1)
BASOPHILS NFR BLD: 1 % (ref 0–1)
DIFFERENTIAL METHOD BLD: ABNORMAL
EOSINOPHIL # BLD: 0.1 K/UL (ref 0–0.4)
EOSINOPHIL NFR BLD: 1 % (ref 0–7)
ERYTHROCYTE [DISTWIDTH] IN BLOOD BY AUTOMATED COUNT: 14.1 % (ref 11.5–14.5)
HCT VFR BLD AUTO: 30 % (ref 35–47)
HGB BLD-MCNC: 9.9 G/DL (ref 11.5–16)
IMM GRANULOCYTES # BLD AUTO: 0.1 K/UL (ref 0–0.04)
IMM GRANULOCYTES NFR BLD AUTO: 1 % (ref 0–0.5)
LYMPHOCYTES # BLD: 2.1 K/UL (ref 0.8–3.5)
LYMPHOCYTES NFR BLD: 24 % (ref 12–49)
MCH RBC QN AUTO: 30.7 PG (ref 26–34)
MCHC RBC AUTO-ENTMCNC: 33 G/DL (ref 30–36.5)
MCV RBC AUTO: 93.2 FL (ref 80–99)
MONOCYTES # BLD: 0.6 K/UL (ref 0–1)
MONOCYTES NFR BLD: 7 % (ref 5–13)
NEUTS SEG # BLD: 5.7 K/UL (ref 1.8–8)
NEUTS SEG NFR BLD: 66 % (ref 32–75)
NRBC # BLD: 0 K/UL (ref 0–0.01)
NRBC BLD-RTO: 0 PER 100 WBC
PLATELET # BLD AUTO: 238 K/UL (ref 150–400)
PMV BLD AUTO: 11.2 FL (ref 8.9–12.9)
RBC # BLD AUTO: 3.22 M/UL (ref 3.8–5.2)
WBC # BLD AUTO: 8.6 K/UL (ref 3.6–11)

## 2019-11-15 PROCEDURE — 74011250637 HC RX REV CODE- 250/637: Performed by: STUDENT IN AN ORGANIZED HEALTH CARE EDUCATION/TRAINING PROGRAM

## 2019-11-15 PROCEDURE — 85025 COMPLETE CBC W/AUTO DIFF WBC: CPT

## 2019-11-15 PROCEDURE — 75410000002 HC LABOR FEE PER 1 HR: Performed by: OBSTETRICS & GYNECOLOGY

## 2019-11-15 PROCEDURE — 36415 COLL VENOUS BLD VENIPUNCTURE: CPT

## 2019-11-15 PROCEDURE — 75410000003 HC RECOV DEL/VAG/CSECN EA 0.5 HR: Performed by: OBSTETRICS & GYNECOLOGY

## 2019-11-15 PROCEDURE — 4A1HXCZ MONITORING OF PRODUCTS OF CONCEPTION, CARDIAC RATE, EXTERNAL APPROACH: ICD-10-PCS | Performed by: OBSTETRICS & GYNECOLOGY

## 2019-11-15 PROCEDURE — 65270000029 HC RM PRIVATE

## 2019-11-15 PROCEDURE — 75410000000 HC DELIVERY VAGINAL/SINGLE: Performed by: OBSTETRICS & GYNECOLOGY

## 2019-11-15 PROCEDURE — 74011250637 HC RX REV CODE- 250/637: Performed by: FAMILY MEDICINE

## 2019-11-15 RX ORDER — ZOLPIDEM TARTRATE 5 MG/1
5 TABLET ORAL
Status: CANCELLED | OUTPATIENT
Start: 2019-11-15

## 2019-11-15 RX ORDER — SODIUM CHLORIDE, SODIUM LACTATE, POTASSIUM CHLORIDE, CALCIUM CHLORIDE 600; 310; 30; 20 MG/100ML; MG/100ML; MG/100ML; MG/100ML
125 INJECTION, SOLUTION INTRAVENOUS CONTINUOUS
Status: DISCONTINUED | OUTPATIENT
Start: 2019-11-15 | End: 2019-11-16 | Stop reason: HOSPADM

## 2019-11-15 RX ORDER — IBUPROFEN 800 MG/1
800 TABLET ORAL EVERY 8 HOURS
Status: CANCELLED | OUTPATIENT
Start: 2019-11-15

## 2019-11-15 RX ORDER — DOCUSATE SODIUM 100 MG/1
100 CAPSULE, LIQUID FILLED ORAL 2 TIMES DAILY
Status: CANCELLED | OUTPATIENT
Start: 2019-11-15

## 2019-11-15 RX ORDER — HYDROCODONE BITARTRATE AND ACETAMINOPHEN 5; 325 MG/1; MG/1
1 TABLET ORAL
Status: CANCELLED | OUTPATIENT
Start: 2019-11-15

## 2019-11-15 RX ORDER — IBUPROFEN 800 MG/1
800 TABLET ORAL EVERY 8 HOURS
Status: DISCONTINUED | OUTPATIENT
Start: 2019-11-15 | End: 2019-11-16 | Stop reason: HOSPADM

## 2019-11-15 RX ORDER — OXYTOCIN/0.9 % SODIUM CHLORIDE 20/1000 ML
999 PLASTIC BAG, INJECTION (ML) INTRAVENOUS ONCE
Status: ACTIVE | OUTPATIENT
Start: 2019-11-15 | End: 2019-11-15

## 2019-11-15 RX ORDER — ACETAMINOPHEN 325 MG/1
650 TABLET ORAL
Status: DISCONTINUED | OUTPATIENT
Start: 2019-11-15 | End: 2019-11-16 | Stop reason: HOSPADM

## 2019-11-15 RX ORDER — NALOXONE HYDROCHLORIDE 0.4 MG/ML
0.4 INJECTION, SOLUTION INTRAMUSCULAR; INTRAVENOUS; SUBCUTANEOUS AS NEEDED
Status: CANCELLED | OUTPATIENT
Start: 2019-11-15

## 2019-11-15 RX ORDER — OXYTOCIN/0.9 % SODIUM CHLORIDE 30/500 ML
0-25 PLASTIC BAG, INJECTION (ML) INTRAVENOUS
Status: DISCONTINUED | OUTPATIENT
Start: 2019-11-15 | End: 2019-11-16 | Stop reason: HOSPADM

## 2019-11-15 RX ORDER — ONDANSETRON 4 MG/1
4 TABLET, ORALLY DISINTEGRATING ORAL
Status: CANCELLED | OUTPATIENT
Start: 2019-11-15 | End: 2019-11-16

## 2019-11-15 RX ORDER — OXYTOCIN/RINGER'S LACTATE 20/1000 ML
125-500 PLASTIC BAG, INJECTION (ML) INTRAVENOUS ONCE
Status: CANCELLED | OUTPATIENT
Start: 2019-11-15 | End: 2019-11-15

## 2019-11-15 RX ORDER — HYDROCORTISONE ACETATE PRAMOXINE HCL 2.5; 1 G/100G; G/100G
CREAM TOPICAL AS NEEDED
Status: CANCELLED | OUTPATIENT
Start: 2019-11-15

## 2019-11-15 RX ORDER — ZOLPIDEM TARTRATE 5 MG/1
5 TABLET ORAL
Status: DISCONTINUED | OUTPATIENT
Start: 2019-11-15 | End: 2019-11-16 | Stop reason: HOSPADM

## 2019-11-15 RX ORDER — HYDROCODONE BITARTRATE AND ACETAMINOPHEN 5; 325 MG/1; MG/1
1 TABLET ORAL
Status: DISCONTINUED | OUTPATIENT
Start: 2019-11-15 | End: 2019-11-16 | Stop reason: HOSPADM

## 2019-11-15 RX ORDER — SIMETHICONE 80 MG
80 TABLET,CHEWABLE ORAL
Status: CANCELLED | OUTPATIENT
Start: 2019-11-15

## 2019-11-15 RX ORDER — NALOXONE HYDROCHLORIDE 0.4 MG/ML
0.4 INJECTION, SOLUTION INTRAMUSCULAR; INTRAVENOUS; SUBCUTANEOUS AS NEEDED
Status: DISCONTINUED | OUTPATIENT
Start: 2019-11-15 | End: 2019-11-15 | Stop reason: HOSPADM

## 2019-11-15 RX ORDER — DIPHENHYDRAMINE HCL 25 MG
25 CAPSULE ORAL
Status: CANCELLED | OUTPATIENT
Start: 2019-11-15

## 2019-11-15 RX ORDER — FERROUS SULFATE 325(65) MG
325 TABLET, DELAYED RELEASE (ENTERIC COATED) ORAL
Status: CANCELLED | OUTPATIENT
Start: 2019-11-15

## 2019-11-15 RX ORDER — SODIUM CHLORIDE 0.9 % (FLUSH) 0.9 %
5-40 SYRINGE (ML) INJECTION EVERY 8 HOURS
Status: DISCONTINUED | OUTPATIENT
Start: 2019-11-15 | End: 2019-11-16 | Stop reason: HOSPADM

## 2019-11-15 RX ORDER — HYDROCORTISONE ACETATE PRAMOXINE HCL 2.5; 1 G/100G; G/100G
CREAM TOPICAL AS NEEDED
Status: DISCONTINUED | OUTPATIENT
Start: 2019-11-15 | End: 2019-11-16 | Stop reason: HOSPADM

## 2019-11-15 RX ORDER — SODIUM CHLORIDE 0.9 % (FLUSH) 0.9 %
5-40 SYRINGE (ML) INJECTION AS NEEDED
Status: DISCONTINUED | OUTPATIENT
Start: 2019-11-15 | End: 2019-11-16 | Stop reason: HOSPADM

## 2019-11-15 RX ADMIN — HYDROCODONE BITARTRATE AND ACETAMINOPHEN 1 TABLET: 5; 325 TABLET ORAL at 08:16

## 2019-11-15 RX ADMIN — IBUPROFEN 800 MG: 800 TABLET ORAL at 14:48

## 2019-11-15 RX ADMIN — IBUPROFEN 800 MG: 800 TABLET ORAL at 22:50

## 2019-11-15 RX ADMIN — HYDROCODONE BITARTRATE AND ACETAMINOPHEN 1 TABLET: 5; 325 TABLET ORAL at 17:11

## 2019-11-15 RX ADMIN — ACETAMINOPHEN 650 MG: 325 TABLET ORAL at 05:36

## 2019-11-15 RX ADMIN — HYDROCODONE BITARTRATE AND ACETAMINOPHEN 1 TABLET: 5; 325 TABLET ORAL at 12:39

## 2019-11-15 RX ADMIN — IBUPROFEN 800 MG: 800 TABLET ORAL at 05:36

## 2019-11-15 NOTE — PROGRESS NOTES
0705-Received report from HOMERO Kimbrough RN  0830-Pt up to bathroom to void-600 ml  0915-Pt received breakfast tray, states the baby appeared to be choking. RN assessed , very small amount of spit-up on baby's neck, baby pink in color and respirations within normal limits. RN explained to patient that baby is doing good and nursery will be notified of her concern. Pt verbalizes understanding. 1015-RN to bedside to check on patient, pt denies needing anything at this time. Pt resting on her side in bed, baby in crib at bedside. Pt reports having used the restroom around 1000. 300 ml urine emptied from measuring container. 1120-Pt resting in bed, denies needing anything else at this time. 1230-RN at bedside helping pt order lunch. RN called lunch order to dietary. Pt  Up to void, completed hamida care, denies needing anything else at this time. Baby remains at pt bedside. 1400-TRANSFER - OUT REPORT:    Verbal report given to [facility] RN(name) on Sary Butcher  being transferred to MIU(unit) for routine progression of care       Report consisted of patients Situation, Background, Assessment and   Recommendations(SBAR). Information from the following report(s) SBAR, Kardex, Intake/Output, MAR, Recent Results and Med Rec Status was reviewed with the receiving nurse. Lines:   Peripheral IV 11/15/19 Right Hand (Active)   Site Assessment Clean, dry, & intact 11/15/2019  1:17 AM   Phlebitis Assessment 0 11/15/2019  1:17 AM   Infiltration Assessment 0 11/15/2019  1:17 AM   Dressing Status Clean, dry, & intact 11/15/2019  1:17 AM   Dressing Type Tape;Transparent 11/15/2019  1:17 AM   Hub Color/Line Status Green;Flushed 11/15/2019  1:17 AM   Action Taken Blood drawn 11/15/2019  1:17 AM        Opportunity for questions and clarification was provided.       Patient transported with:   Registered Nurse

## 2019-11-15 NOTE — PROGRESS NOTES
Pt VSS and afebrile. Pt reports positive fetal movement, painful ctx and clear LOF. Pt states her water broke 11/14/2019 at 2300. Abd is soft and nontender to palpation. Pt denies bleeding. Pt educated on POC and what s/s to notify nurse for and verbalizes understanding. Pt resting in bed with no complaint. Bed low call bell in reach will continue to monitor.

## 2019-11-15 NOTE — PROGRESS NOTES
11/15/19 12:19 PM  CM met with MADHAV to complete initial assessment and to begin discharge planning. Demographics were reviewed and confirmed. MADHAV lives at the listed address with her three children, a 12year old son and two daughters who are 15 and 15. MADHAV noted that TRAE was deported back to [place] 4 months ago. 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 Supports include her sister, Amor Bradford ([phone]), her father in law, and her brother in law. MADHAV is breastfeeding. MADHAV is borrowing a carseat from her sister. She has a \"small crib\" to use for home for safe sleep. MADHAV has [address] services through [facility]) and plans to call today to schedule enrollment appointment for the baby. [employer] attempted to complete Medicaid screening this morning; they will return this afternoon to try again to do screening for both MOB and baby. MADHAV's sister will provide transportation home at discharge on Sunday. MADHAV is unsure of pediatrician choice, as she plans to contact her sister to find out the name and number of the provider. She thinks it is at the [address] [facility]. [address] through [address] [place] [address] at [facility].   Care Management Interventions  PCP Verified by CM: Yes(SFFP)  Mode of Transport at Discharge: Self  Transition of Care Consult (CM Consult): Discharge Planning  Current Support Network: Own Home, Family Lives Nearby  Confirm Follow Up Transport: Family  Plan discussed with Pt/Family/Caregiver: Yes  Freedom of Choice Offered: Yes  Discharge Location  Discharge Placement: Home with family assistance  TRINI Robles

## 2019-11-15 NOTE — PROGRESS NOTES
Marshall.Shazia Alejandra, resident MD, at bedside to evaluate pt. Resident to notify attending of pt's arrival and current status. [address] [place] [address] notified pt complete and beginning to involuntarily push. MD to come to bedside for delivery. 5856  viable female infant, apgars 9/9.     0705 Bedside shift change report given to JESSICA Mccall RN (oncoming nurse) by HOMERO Kurtz RN (offgoing nurse). Report included the following information SBAR, Kardex, Intake/Output, MAR, Recent Results and Med Rec Status.

## 2019-11-15 NOTE — H&P
[address] [address]   Office [phone], Fax [phone]      History & Physical    Name: Radha Tomas MRN: [id number]  SSN: [id number]    YOB: 1985  Age: 29 y.o. Sex: female      Subjective:     Reason for Admission:  Pregnancy and Contractions and LOF    Estimated Date of Delivery: 19  OB History    Para Term  AB Living   7 6 5 1 0 3   SAB TAB Ectopic Molar Multiple Live Births   0 0 0 0   3      # Outcome Date GA Lbr John/2nd Weight Sex Delivery Anes PTL Lv   7 Current            6 Term 07 39w0d  2.268 kg F Vag-Spont None  ANDRE   5 Term 06 39w6d  2.041 kg M Vag-Spont None  ANDRE   4 Term 03 39w4d  4.5 kg M Vag-Spont None N ANDRE   3             2 Term            1 Term                History of Present Illness: Ms. John Shaw is a 29 y.o.   female with an EGA of 38w5d with Estimated Date of Delivery: 19. Pregnancy has been complicated by abnormal pap, late to care, anemia. Patient complains of moderate contractions since 5AM that are 15-20 minutes apart. Patient also reports leakage of fluid with tinge of blood that soaked through her undergarment around 4PM. Has been feeling some pelvic pressure, but no urge to push. Patient denies fever, chills, SOB, chest pain, N/V, LE pain. Reports good fetal movement. OB History    Para Term  AB Living   7 6 5 1 0 3   SAB TAB Ectopic Molar Multiple Live Births   0 0 0 0   3      # Outcome Date GA Lbr John/2nd Weight Sex Delivery Anes PTL Lv   7 Current            6 Term 07 39w0d  2.268 kg F Vag-Spont None  ANDRE   5 Term 06 39w6d  2.041 kg M Vag-Spont None  ANDRE   4 Term 03 39w4d  4.5 kg M Vag-Spont None N ANDRE   3             2 Term            1 Term              No past medical history on file. No past surgical history on file.   Social History     Occupational History    Not on file   Tobacco Use    Smoking status: Never Smoker    Smokeless tobacco: Never Used   Substance and Sexual Activity    Alcohol use: Not Currently     Frequency: Never    Drug use: Never    Sexual activity: Yes      No family history on file. No Known Allergies  Prior to Admission medications    Medication Sig Start Date End Date Taking? Authorizing Provider   prenatal vit-calcium-iron-fa (PRENATAL PLUS WITH CALCIUM) 27 mg iron- 1 mg tab Take 1 Tab by mouth daily. 19  Yes Joy Jay MD   PNV No12-Iron-FA-DSS-OM-3 29 mg iron-1 mg -50 mg CPKD Take  by mouth. Provider, Historical   famotidine (PEPCID) 10 mg tablet Take 1 Tab by mouth two (2) times a day. 10/9/19   Juine Viera MD   ferrous sulfate (IRON) 325 mg (65 mg iron) EC tablet Take 1 Tab by mouth Daily (before breakfast). 19   Junie Viera MD        Review of Systems:  Pertinent items are noted in the History of Present Illness. Objective:     Vitals:    Vitals:    11/15/19 0031   BP: 114/73   Pulse: 91   Resp: 16   Temp: 98.1 °F (36.7 °C)      Temp (24hrs), Av.1 °F (36.7 °C), Min:98.1 °F (36.7 °C), Max:98.1 °F (36.7 °C)    BP  Min: 114/73  Max: 114/73     Physical Exam:  Patient without distress. Heart: Regular rate and rhythm, S1S2 present or without murmur or extra heart sounds  Lung: clear to auscultation throughout lung fields, no wheezes, no rales, no rhonchi and normal respiratory effort  Abdomen: Gravid  Cervical Exam: 6/100/-1 (by RN)  Lower Extremities: No edema or tenderness   Membranes:  SROM. Nitrazine positive  Uterine Activity:  Started 19 0500  Fetal Heart Rate:  Reactive  Baseline: 135 per minute  Variability: moderate  Accelerations: yes       Lab/Data Review:  No results found for this or any previous visit (from the past 24 hour(s)). Assessment and Plan:     Ms. Ciara Peterson is a 29 y.o.   female with an estimated gestational age of 44w7d who is in observation for labor rule-out. SROM with SIUP: 38w5d. Late to care at 29 weeks.  PNL: O+, Ab screen neg, Hgb fractionation neg, VZV immune,  Rubella immune, HbsAg neg, RPR NR, HIV NR, GBS negative. s/p influenza and Tdap (19)   Ultrasound done at 29w4d: EFW 56%, anterior placenta, normal anatomy   Continuous FHT/toco monitoring   Nitrazine positive   Bedside TAUS confirms vertex presentation   SVE 6/100/-1 (by RN). Continue SVE to monitor for progression   Anticipate     Anemia:   - Home iron 325mg daily    GERD:  - Home pepcid 10mg BID    Abnormal pap: ASCUS and HPV positive  - Patient requests to defer until 6weeks postpartum. Follow up outpatient.        Patient discussed with Dr. Arianna Wesley DO  Family Medicine Resident

## 2019-11-15 NOTE — LACTATION NOTE
This note was copied from a baby's chart. Experienced breastfeeding mother. Baby has been latching on and breastfeeding well. Mother given breastfeeding handouts in [address] [facility]. Discussed with mother her plan for feeding. Reviewed the benefits of exclusive breast milk feeding during the hospital stay. Informed her of the risks of using formula to supplement in the first few days of life as well as the benefits of successful breast milk feeding; referred her to the Breastfeeding booklet about this information. She acknowledges understanding of information reviewed and states that it is her plan to breast/foirmula feed her infant. Will support her choice and offer additional information as needed. Mother  will successfully establish breastfeeding by feeding in response to early feeding cues   or wake every 3h, will obtain deep latch, and will keep log of feedings/output. Taught to BF at hunger cues and or q 2-3 hrs and to offer 10-20 drops of hand expressed colostrum at any non-feeds. Breast Assessment  Left Breast: Medium  Left Nipple: Everted, Intact  Right Breast: Medium  Right Nipple: Everted, Intact  Breast- Feeding Assessment  Attends Breast-Feeding Classes: No  Breast-Feeding Experience: Yes(breast fed 2 other children)  Breast Trauma/Surgery: No  Type/Quality: Good(Plans to breast/formula feed her .  Instructed mother to offer baby breast 1st)  Lactation Consultant Visits  Breast-Feedings: Good (Baby was latched onto left breast and was breastfeeding vigorously. )  Mother/Infant Observation  Mother Observation: Alignment, Holds breast, Close hold  Infant Observation: Audible swallows, Lips flanged, lower, Lips flanged, upper, Opens mouth, Latches nipple and aereolae, Rhythmic suck  LATCH Documentation  Latch: Grasps breast, tongue down, lips flanged, rhythmic sucking  Audible Swallowing: A few with stimulation  Type of Nipple: Everted (after stimulation)  Comfort (Breast/Nipple): Soft/non-tender  Hold (Positioning): No assist from staff, mother able to position/hold infant  LATCH Score: 9

## 2019-11-15 NOTE — PROGRESS NOTES
[address] Medicine Residency Attending Addendum:  Patient encounter was discussed on the day of the encounter with Neetu Nair MD, performing the key elements of the service. I discussed the findings, assessment and plan with the resident and agree with the resident's findings and plan as documented in the resident's note.       Regine Oseguera MD, CAQSM, RMSK

## 2019-11-15 NOTE — L&D DELIVERY NOTE
Delivery Summary    Patient: Malgorzata Carr MRN: [id number]  SSN: [id number]    YOB: 1985  Age: 29 y.o. Sex: female       Information for the patient's :  Momo Hui, Female Esperanza Overcast [[id number]]       Labor Events:    Labor: No    Steroids: None   Cervical Ripening Date/Time:       Cervical Ripening Type: None   Antibiotics During Labor: No   Rupture Identifier:      Rupture Date/Time: 2019 11:00 PM   Rupture Type: SROM   Amniotic Fluid Volume: Moderate    Amniotic Fluid Description:      Amniotic Fluid Odor: None    Induction: None       Induction Date/Time:        Indications for Induction:      Augmentation: None   Augmentation Date/Time:      Indications for Augmentation:     Labor complications: None       Additional complications:        Delivery Events:  Indications For Episiotomy:     Episiotomy: None   Perineal Laceration(s): None   Repaired:     Periurethral Laceration Location:      Repaired:     Labial Laceration Location:     Repaired:     Sulcal Laceration Location:     Repaired:     Vaginal Laceration Location:     Repaired:     Cervical Laceration Location:     Repaired:     Repair Suture:     Number of Repair Packets:     Estimated Blood Loss (ml):  ml     Delivery Date: 11/15/2019    Delivery Time: 4:35 AM  Delivery Type: Vaginal, Spontaneous  Sex:  Female    Gestational Age: 44w7d   Delivery Clinician:  Sari Birch  Living Status: Living   Delivery Location: L&D            APGARS  One minute Five minutes Ten minutes   Skin color: 1   1        Heart rate: 2   2        Grimace: 2   2        Muscle tone: 2   2        Breathin   2        Totals: 9   9            Presentation: Vertex    Position:        Resuscitation Method:  Tactile Stimulation;Suctioning-bulb     Meconium Stained: None      Cord Information: 3 Vessels  Complications: None  Cord around:    Delayed cord clamping?  Yes  Cord clamped date/time:   Disposition of Cord Blood: Lab Blood Gases Sent?: No    Placenta:  Date/Time: 11/15/2019  4:39 AM  Removal: Spontaneous      Appearance: Normal      Measurements:  Birth Weight:        Birth Length:        Head Circumference:        Chest Circumference:       Abdominal Girth: Other Providers:   Brittni BERNARDO;TUYET DAVIDSON;TICO BEST;ED COUCH;NIKHIL NYE, Obstetrician;Primary Nurse;Primary  Nurse;Charge Nurse;Resident;Staff Nurse           Group B Strep: No results found for: ARMANDO Beard  Information for the patient's :  Evelyn Gigi, Female Daniel Stroud [[id number]]   No results found for: ABORH, PCTABR, PCTDIG, BILI, ABORHEXT, ABORH    No results for input(s): PCO2CB, PO2CB, HCO3I, SO2I, IBD, PTEMPI, SPECTI, PHICB, ISITE, IDEV, IALLEN in the last 72 hours.

## 2019-11-15 NOTE — PROGRESS NOTES
Bedside and Verbal shift change report given to HOMERO Oropeza RN (oncoming nurse) by Eleni Richardson RN (offgoing nurse). Report included the following information SBAR, Kardex, Intake/Output and MAR.

## 2019-11-16 VITALS
RESPIRATION RATE: 18 BRPM | SYSTOLIC BLOOD PRESSURE: 97 MMHG | DIASTOLIC BLOOD PRESSURE: 55 MMHG | HEART RATE: 91 BPM | TEMPERATURE: 97.7 F

## 2019-11-16 PROCEDURE — 74011250637 HC RX REV CODE- 250/637: Performed by: STUDENT IN AN ORGANIZED HEALTH CARE EDUCATION/TRAINING PROGRAM

## 2019-11-16 RX ORDER — IBUPROFEN 800 MG/1
800 TABLET ORAL EVERY 8 HOURS
Qty: 90 TAB | Refills: 0 | Status: SHIPPED | OUTPATIENT
Start: 2019-11-16 | End: 2019-12-16

## 2019-11-16 RX ADMIN — IBUPROFEN 800 MG: 800 TABLET ORAL at 06:25

## 2019-11-16 RX ADMIN — IBUPROFEN 800 MG: 800 TABLET ORAL at 17:18

## 2019-11-16 NOTE — PROGRESS NOTES
11/16/2019  4:11 PM    CM received call from pt's nurse inquiring about pt's Medicaid application. CM met with pt and pt stated no one returned to complete Medicaid application. CM informed pt that [employer] is not in the office on the weekend. CM provided pt with Medicaid application and contact number for Perla, informed pt that Emergency Medicaid would only cover her labor/delivery and will not cover any doctor visits, and will cover baby's first full year. Provided pt with Care Card application. Pt will call [facility] to schedule appt for follow up. No further needs noted for pt at this time.      Lyly Oakes,  Care Management

## 2019-11-16 NOTE — PROGRESS NOTES
[address]   Office [phone], Fax [phone]                                Post-Partum Day Number 1 Progress Note    Patient doing well post-partum without significant complaint. Pain well controlled. Lochia minimal.  Tolerating regular diet. Ambulating. Voiding without difficulty. passing flatus. no BM. Continues to breast feed. Requesting to go home today because she has 3 other children at home and no one to take care of them. Vitals:    Patient Vitals for the past 8 hrs:   BP Temp Pulse Resp   11/15/19 2328 94/59 97.9 °F (36.6 °C) 84 12     Temp (24hrs), Av.1 °F (36.7 °C), Min:97.8 °F (36.6 °C), Max:98.6 °F (37 °C)      Exam:  Patient without distress. CTAB, no w/r/r/c.               RRR, +S1 and S2, no m/r/g. Abdomen soft, fundus firm at level of umbilicus, nontender. Perineum with normal lochia noted. Lower extremities:  No edema. No palpable cords or tenderness. Lab/Data Review:  No results found for this or any previous visit (from the past 12 hour(s)). Assessment and Plan:    Shubham Rowe is a 29 y.o. [id number] s/p  at 38 weeks 5 days. Pregnancy was complicated by abnormal pap, late to prenatal care, anemia. Patient appears to be having uncomplicated post-partum course. 1. Continue routine perineal care and maternal education. 2. Abnormal pap: ASCUS and HPV positive- -Need colposcopy 6 week post   3. Baby Girl- breastfeeding exclusively   4. Pediatrician- unsure of the name but states the [facility]   5. Pt requesting to go home today as there is no one at home to watch her other 3 children. She is stable from an OB standpoint and I am comfortable sending her home today. However, I informed her that it will be up to the pediatrician if the baby can do home today. Patient discussed with Dr. Myles Norman [facility]).                  Ho Wilcox, DO  Family Medicine Resident

## 2019-11-16 NOTE — PROGRESS NOTES
Patient discharged home in stable condition, Discharge paper work reviewed and signed, bands verified and cut, patient understands when to follow up for herself and the infant, she understands that she should not take percocet and tylenol together, no further questions,gift pack given, infant placed in car seat by parents, patient escorted out by nurse, all discharge paperwork reviewed with [employer] phone

## 2019-11-16 NOTE — DISCHARGE INSTRUCTIONS
Patient Education     Patient Education     POST DELIVERY DISCHARGE INSTRUCTIONS    Name: Eva Moreno  YOB: 1985  Primary Diagnosis: Active Problems:    Pregnancy (11/15/2019)        General:     Diet/Diet Restrictions:  Eight 8-ounce glasses of fluid daily (water, juices); avoid excessive caffeine intake. Meals/snacks as desired which are high in fiber and carbohydrates and low in fat and cholesterol. Physical Activity / Restrictions / Safety:     Avoid heavy lifting, no more that 8 lbs. For 2-3 weeks; limit use of stairs to 2 times daily for the first week home. No driving for one week. Avoid intercourse 4-6 weeks, no douching or tampon use. Check with obstetrician before starting or resuming an exercise program.         Discharge Instructions/Special Treatment/Home Care Needs:     Continue prenatal vitamins. Continue to use squirt bottle with warm water on your episiotomy after each bathroom use until bleeding stops. If steri-strips applied to your incision, remove in 7-10 days. Call your doctor for the following:     Fever over 101 degrees by mouth. Vaginal bleeding heavier than a normal menstrual period or clot larger than a golf ball. Red streaks or increased swelling of legs, painful red streaks on your breast.  Painful urination, constipation and increased pain or swelling or discharge with your incision. If you feel extremely anxious or overwhelmed. If you have thoughts of harming yourself and/or your baby. Pain Management:     Pain Management:   Take Acetaminophen (Tylenol) or Ibuprofen (Advil, Motrin), as directed for pain. Use a warm Sitz bath 3 times daily to relieve episiotomy or hemorrhoidal discomfort. Heating pad to  incision as needed. For hemorrhoidal discomfort, use Tucks and Anusol cream as needed and directed. Follow-Up Care:      These are general instructions for a healthy lifestyle:    No smoking/ No tobacco products/ Avoid exposure to second hand smoke    Surgeon General's Warning:  Quitting smoking now greatly reduces serious risk to your health. Obesity, smoking, and sedentary lifestyle greatly increases your risk for illness    A healthy diet, regular physical exercise & weight monitoring are important for maintaining a healthy lifestyle    Recognize signs and symptoms of STROKE:    F-face looks uneven    A-arms unable to move or move unevenly    S-speech slurred or non-existent    T-time-call 911 as soon as signs and symptoms begin-DO NOT go       Back to bed or wait to see if you get better-TIME IS BRAIN. Después del parto (período de posparto): Instrucciones de cuidado - [ After Your Delivery (the Postpartum Period): Care Instructions ]  Instrucciones de cuidado    Felicidades por el nacimiento de holloway bebé. Al igual que el Stacey, el tiempo con el recién nacido puede ser un momento de Bells, Harshal Delgado y agotamiento. Es posible que se sienta watson al mirar la carl de holloway pequeño bebé. También podría sentirse abrumada por holloway nuevo ritmo de sueño y las nuevas responsabilidades. Al principio, los bebés suelen dormir shamir el día y permanecen despiertos shamir la noche. No tienen ningún patrón ni rutina. Podrían ludwig gritos ahogados, sacudirse y despertarse, o parecer adriano si tuvieran los ojos cruzados (bizcos). Todo esto es normal, e incluso la pueden hacer sonreír. Shamir las primeras semanas siguientes al parto, trate de cuidarse andrew. Podría tardar de 4 a 6 semanas en volver a sentirse usted misma, y posiblemente más tiempo si le olmedo hecho manisha cesárea. Es probable que se sienta muy fatigada shamir varias semanas. Estefania días estarán llenos de [place], georgette también de mucha alegría. La atención de seguimiento es manisha parte clave de holloway tratamiento y seguridad. Asegúrese de hacer y acudir a todas las citas, y llame a holloway médico si está teniendo problemas.  También es manisha buena idea saber los resultados de estefania exámenes y mantener manisha lista de los medicamentos que pablito. ¿Cómo puede cuidarse en el hogar? Cuide holloway cuerpo después del parto  · Utilice toallas sanitarias en vez de tampones para las pérdidas de ortega que podrían durar hasta 2 semanas. · Alivie los cólicos con ibuprofeno (Advil, Motrin). · Alivie el dolor de las hemorroides y la charissa entre la vagina y el recto con compresas de hielo o de infusión de hamamelis Anabela Baptise (\"witch hazel\"). · Alivie el estreñimiento bebiendo mucho líquido y comiendo alimentos ricos en fibra. Pregúntele a holloway médico acerca de los ablandadores de heces de [place]. · Límpiese con un chorrito suave de agua tibia de manisha botella en vez de hacerlo con papel higiénico.  · [place] un baño de asiento en agua tibia varias veces al día. · Use un buen sostén de lactancia. Alivie el dolor y la hinchazón de los senos con toallitas de aseo húmedas tibias. · Si no está amamantando, utilice hielo en lugar de calor para aliviar el dolor de los senos. · Si está amamantando, holloway período menstrual podría no comenzar hasta después de varios meses. Es posible que, al principio, ortega más y Kamuel[place] de lo que lo hacía antes del [facility]. · Espere hasta que haya sanado (de 4 a 6 semanas) antes de volver a tener relaciones sexuales. Holloway médico le dirá cuándo puede tener relaciones sexuales. · Trate de no viajar con el bebé shamir las primeras 5 o 6 semanas. Si hace un viaje serafin en automóvil, carol paradas frecuentes para caminar y estirarse. Evite el agotamiento  · Descanse todos los días. Trate de dormir la siesta cuando holloway bebé también lo carol. · Pídale a otro adulto que la acompañe por unos migue después del Adolph. · Planifique el cuidado de los niños si tiene otros hijos. · Sea flexible para que pueda comer a horas fuera de lo común y dormir cuando lo necesite. Tanto usted adriano holloway bebé están creando horarios nuevos. · Planee pequeñas salidas para estar fuera de casa.  El cambio podría hacer que se sienta menos fatigada. · Pida ayuda para cocinar y [address] [facility] hogar y las compras. Recuerde que holloway principal tarea consiste en cuidar a holloway bebé. Sepa qué ayuda puede recibir en daniela de tener depresión posparto  · La depresión posparto es común shamir las primeras 1 o 2 semanas siguientes al parto. Podría llorar o sentirse steve o irritable sin razón alguna. · Descanse cada vez que pueda hacerlo. Estar fatigada dificulta manejar las emociones. · Salga a caminar con holloway bebé. · Hable con holloway jo, estefania amigos y estefania familiares acerca de estefania sentimientos. · Si estefania síntomas love más de unas pocas semanas, o si se siente muy deprimida, pídale ayuda a holloway médico.  · La depresión posparto puede tratarse. Los grupos de apoyo y la asesoría psicológica pueden ser Efren Pae. A veces, los medicamentos también pueden ayudar. Manténgase saludable  · Coma alimentos saludables para tener más energía y adelgazar las libras adicionales que engordó con el bebé. · Si amamanta, evite las drogas. Si dejó de fumar shamir el embarazo, trate de no volver a fumar. Si opta por morris manisha bebida alcohólica de vez en cuando, solo tome manisha bebida y limite la cantidad de ocasiones en que albino alcohol. Después de beber alcohol, espere al menos 2 horas antes de amamantar para reducir la cantidad de alcohol que el bebé pueda recibir a través de la [place]. · Comience a hacer ejercicios diarios después de 4 a 6 semanas, georgette descanse cuando se sienta fatigada. · Aprenda ejercicios para tonificar el abdomen. Brock ejercicios de Kegel para recuperar la fuerza de los músculos pélvicos. Puede hacer los ejercicios de Keg[place] mientras está de pie o sentada. ? Apriete los mismos músculos que usted usaría para detener la [place]. El abdomen y los muslos no deben moverse. ? Manténgalos apretados shamir 3 segundos y, luego, relájelos otros 3 segundos. ? Empiece con 3 segundos.  Egbert Saint, añada 1 ronak cada semana hasta que sea capaz de apretar shamir 10 segundos. ? Repita el ejercicio entre 10 y 13 veces [address] Brock renato o más sesiones cada día. · Busque manisha clase para nuevas madres y recién nacidos que tenga un tiempo de ejercicio. · Si le olmedo hecho manisha cesárea, dese un poco más de tiempo antes de hacer ejercicio, y tenga cuidado. ¿Cuándo debe pedir ayuda? Llame al 911 en cualquier momento que considere que necesita atención de [place]. Por ejemplo, llame si:    · Tiene pensamientos de lastimarse o de hacerle daño a guevara bebé o a otra persona.     · Se desmayó (perdió el conocimiento).     · Tiene dolor en el pecho, le falta el aire o tose ortega.     · Tiene convulsiones.    Llame a guevara médico ahora mismo o busque atención médica de inmediato si:    · Tiene sangrado vaginal intenso. Tarina significa que está expulsando coágulos sanguíneos y empapando manisha toalla sanitaria cada hora por 2 horas o más.     · Está mareada o aturdida, o siente adriano que se puede desmayar.     · Tiene fiebre.     · Tiene dolor abdominal nuevo o más intenso.     · Tiene señales de un coágulo de ortega en la pierna (que se llama trombosis venosa profunda), adriano:  ? Dolor en la pantorrilla, el muslo, la mariel o detrás de la rodilla. ? Enrojecimiento e hinchazón en la pierna o la mariel.     · Tiene señales de preeclampsia, adriano:  ? Hinchazón repentina de la xavi, las vera o los pies. ? Nuevos problemas de la vista (adriano oscurecimiento, atiya borroso o atiya puntos). ? Dolor de qasim intenso.    Preste especial atención a los cambios en guevara arvin y asegúrese de comunicarse con guevara médico si:    · Guevara sangrado vaginal parece volverse más intenso.     · Tiene flujo vaginal nuevo o que empeora.     · Se siente steve, ansiosa o sin esperanzas shamir más de unos pocos días.     · No mejora adriano se esperaba. ¿Dónde puede encontrar más información en inglés? Kerri Wallis a http://al-lyndsey.info/.   Rita Crowder [id number] en la búsqueda para aprender más acerca de \"Después del parto (período de posparto): Instrucciones de cuidado - [ After Your Delivery (the Postpartum Period): Care Instructions ]. \"  Revisado: 2019  Versión del contenido: 12.2  © 4394-0871 Healthwise, Incorporated. Las instrucciones de cuidado fueron adaptadas bajo licencia por [employer] (which disclaims liability or warranty for this information). Si usted tiene [place] afección médica o sobre estas instrucciones, siempre pregunte a holloway profesional de arvin. Healthwise, Incorporated niega toda garantía o responsabilidad por holloway uso de esta información. Patient Discharge Instructions    Hilda Wilson / [id number] : 1985    Admitted 11/15/2019 Discharged: 2019       Please bring this form with you to show your care provider at your follow-up appointment. Primary care provider:  None    Discharging provider:  Alise Martell DO  - Family Medicine Resident  Dr. Susan Tee Attending     ACUTE DIAGNOSES:  · Pregnancy [Z34.90]        FOLLOW-UP CARE RECOMMENDATIONS:    Follow-up Information     Follow up With Specialties Details Why Contact Info    Arpan Hsieh MD Family Practice Call on 2019 Please call [facility] and make an appointment for a 6 week post partum visit  [id number] [facility]  [address]  AND SCHEDULE AN APPOINTMENT FOR YOUR BABY FOR A WEIGHT CHECK ON . PLEASE HAVE YOUR PHONE CLOSE BY TOMORROW AM SO THAT I CAN TELL YOU WHEN THE APPOINTMENT WILL BE.       Continuing Therapy:  - Please continue Motrin 800 mg, 1 tablet every 8 hours for the next 2 days, then 1 tablet every 8 hours as needed for pain  - Please continue Ferrous Sulfate 325 mg for anemia, Take 1 tablet ONE times daily  - Please continue your prenatal vitamins     Follow-up tests needed:   -Need colposcopy 6 week post partum to follow up on ASCUS+ and HPV+ pap -CBC to f/u on Anemia     Pending test results: At the time of your discharge the following test results are still pending: none. Please make sure you review these results with your outpatient follow-up provider(s). Specific symptoms to watch for: chest pain, shortness of breath, fever, chills, nausea, vomiting, diarrhea, change in mentation, falling, weakness, bleeding. DIET/what to eat:  Regular Diet    ACTIVITY:   Activity Instructions    Do not lift anything heavier than your baby for 6 weeks. Nothing in the vagina for 6 weeks. You are ok to drive as long as you are not taking Percocet or other narcotic pain medication (Motrin is ok). Wound care: NOTHING IN THE VAGINAL FOR 6 WEEKS. A SMALL AMOUNT OF SPOTTING IS NORMAL. IF YOU START TO HAVE REGULAR BLEEDING THAT IS GREATER THAN A PERIOD YOU NEED TO CALL YOUR DOCTOR RIGHT AWAY. I understand that if any problems occur once I am at home I am to contact my physician. These instructions were explained to me and I had the opportunity to ask questions. I understand and acknowledge receipt of the instructions indicated above. Physician's or R.N.'s Signature                                                                  Date/Time                                                                                                                                              Patient or Representative Signature                                                          Date/Time       El período posparto: Instrucciones de cuidado-Instrucciones de cuidado - [ Postpartum: Care Instructions ]  Instrucciones de cuidado  Después del parto (período posparto), holloway cuerpo pasa por muchos cambios. Algunos de estos cambios suceden shamir varias semanas.  [address], el cuerpo comienza a recuperarse y se prepara para el amamantamiento. Es posible que [address] se sienta sensible. Las hormonas pueden cambiar holloway estado de ánimo sin advertencia y sin motivo aparente. Jennifer las primeras [address] después del parto, muchas mujeres tienen emociones que [place] de watson a steve. Es posible que le resulte difícil dormir. Es posible que llore mucho. [place] se llama \"melancolía de la maternidad\". Estas emociones abrumadoras suelen desaparecer dentro de unos días o semanas. Yeny es importante hablar con holloway médico acerca de estefania sentimientos. Jennifer las primeras semanas después del Gordon, es fácil cansarse demasiado o sentirse [place]. No carol demasiados esfuerzos. Descanse cada vez que pueda, acepte la ayuda de los demás, coma andrew y albino abundantes líquidos. En el primer par de Santiago Charitomaid de ludwig a olivia, es posible que holloway médico o partera deseen verla y hacer un plan para cualquier atención de seguimiento que usted pueda necesitar. Probablemente tendrá Susana Senate fabio completa de posparto dentro de los primeros 3 meses después del Gordon. En jabier momento, holloway médico o partera revisarán holloway recuperación del parto. Él o jose manuel también verán cómo está enfrentando usted estefania emociones y conversarán sobre estefania inquietudes y preguntas. La atención de seguimiento es manisha parte clave de holloway tratamiento y seguridad. Asegúrese de hacer y acudir a todas las citas, y llame a holloway médico si está teniendo problemas. También es manisha buena idea saber los resultados de estefania exámenes y mantener manisha lista de los medicamentos que pablito. ¿Cómo puede cuidarse en el hogar? · Duerma o descanse cuando holloway bebé lo carol. · Si es posible, permita que estefania familiares o estefania amigos la ayuden con las tareas del hogar. No intente hacerlo todo usted ghada. · Si tiene hemorroides o hinchazón o dolor alrededor de la abertura de la vagina, pruebe aplicarse frío y calor.  Puede aplicarse hielo o manisha compresa fría en la charissa jennifer 10 a 20 minutos cada vez. Póngase un paño alonso entre el hielo y la piel. Además, trate de sentarse en algunos centímetros de agua tibia (baño de asiento) 3 veces al día y después de las evacuaciones. · [employer] analgésicos (medicamentos para el dolor) exactamente según las indicaciones. ? Si el médico le recetó un analgésico, tómelo según las indicaciones. ? Si no está tomando un analgésico recetado, pregúntele a holloway médico si puede morris rohit de [employer]. · Coma más fibra para evitar el estreñimiento. Incluya alimentos adriano panes y cereales integrales, verduras crudas, frutas crudas y secas, y frijoles (habichuelas). · Lisa abundantes líquidos, los suficientes adriano para que holloway orina sea de color amarillo benny o transparente adriano el agua. Si tiene [employer], del corazón o del hígado y tiene que [place]'s líquidos, hable con holloway médico antes de aumentar holloway consumo. · No se lave el interior de la vagina con líquidos (lavados vaginales). · Si tiene puntos de sutura, mantenga la charissa limpia con agua tibia, ya sea echándola o rociándola sobre la charissa externa de la vagina y el ano después de usar el baño. · Brock manisha lista de preguntas para hacerle a holloway médico o partera. Radha preguntas podrían ser sobre lo siguiente:  ? [employer] senos, adriano bultos o sensibilidad. ? Cuándo esperar que regrese holloway período menstrual.  ? Qué forma de anticoncepción es la más adecuada para usted. ? El peso que aumentó shamir el [place]. ? Las opciones de [address]. ? Vannie Alan y bebidas son mejores para usted, sobre todo si está amamantando. ? Problemas que podría tener con la lactancia. ? Cuándo puede [employer]. Algunas mujeres mike vez quieran hablar sobre lubricantes vaginales. ? Cualquier sentimiento de tristeza o inquietud que tenga. ¿Cuándo debe pedir ayuda? Llame al 911 en cualquier momento que considere que necesita atención de [place].  Por ejemplo, llame si:    · Tiene pensamientos de lastimarse o de hacerle daño a holloway bebé o a otra persona.     · Se desmayó (perdió el conocimiento).     · Tiene dolor en el pecho, le falta el aire o tose ortega.     · Tiene convulsiones.    Llame a holloway médico ahora mismo o busque atención médica de inmediato si:    · Holloway sangrado vaginal parece hacerse más abundante.     · Se siente mareada o aturdida, o que está a punto de desmayarse.     · Tiene fiebre.     · Tiene dolor abdominal nuevo o más intenso.     · Tiene síntomas de un coágulo de ortega en la pierna (que se llama trombosis venosa profunda), adriano:  ? Dolor en la pantorrilla, el muslo, la mariel o detrás de la rodilla. ? Enrojecimiento e hinchazón en la pierna o la mariel.     · Tiene señales de preeclampsia, adriano:  ? Hinchazón repentina de la xavi, las evra o los pies. ? Nuevos problemas de la vista (adriano oscurecimiento, atiya borroso o atiya puntos). ? Dolor de qasim intenso.    Preste especial atención a los cambios en holloway arvin y asegúrese de comunicarse con holloway médico si:    · Tiene nuevo flujo vaginal o parth empeora.     · Se siente steve o deprimida.     · Tiene problemas con los senos o el amamantamiento. ¿Dónde puede encontrar más información en inglés? Giulia Amend a http://al-lyndsey.info/. Malcolm Rojas S132 en la búsqueda para aprender más acerca de \"El período posparto: Instrucciones de cuidado-Instrucciones de cuidado - [ Postpartum: Care Instructions ]. \"  Revisado: 29 crisostomo, 2019  Versión del contenido: 12.2  © 5109-8299 Healthwise, Incorporated. Las instrucciones de cuidado fueron adaptadas bajo licencia por [employer] (which disclaims liability or warranty for this information). Si usted tiene [place] afección médica o sobre estas instrucciones, siempre pregunte a holloway profesional de arvin. Healthwise, Incorporated niega toda garantía o responsabilidad por holloway uso de esta información.

## 2019-11-16 NOTE — DISCHARGE SUMMARY
Obstetrical Discharge Summary     Name: Mena Dunn MRN: [id number]  SSN: [id number]    YOB: 1985  Age: 29 y.o. Sex: female      Admit Date: 11/15/2019    Discharge Date: 2019     Admitting Physician: Wanda Maynard MD     Attending Physician:  Deepti Dowling MD     Admission Diagnoses: Pregnancy [Z34.90]    Discharge Diagnoses:   Information for the patient's :  Patsie Mulligan, Female [facility] [[id number]]   Delivery of a 3.485 kg female infant via Vaginal, Spontaneous on 11/15/2019 at 4:35 AM  by Wanda Maynard. Apgars were 9  and 9 . Additional Diagnoses:   Hospital Problems  Date Reviewed: 10/23/2019          Codes Class Noted POA    Pregnancy ICD-10-CM: Z34.90  ICD-9-CM: V22.2  11/15/2019 Unknown             Lab Results   Component Value Date/Time    Rubella, External Immune 2019    GrBStrep, External Negative 2019       Immunization(s):   Immunization History   Administered Date(s) Administered    Influenza Vaccine (Quad) PF 2019    Tdap 2019        Hospital Course:   Patient is a 29 y.o. Mena Dunn is a 29 y.o. [id number] s/p  at 38 weeks 5 days. Pregnancy was complicated by abnormal pap (ASCUS & HPV+), late to prenatal care at 29w , and anemia on home iron 325 daily. Labor was uncomplicated. Delivered TLFI by Dr. Sacha Aleman without complications.  Normal hospital course following the delivery.  Patient requested early discharge because she has her other children at home that need supervision and she does not want to leave them alone tonight. On day of discharge patient reported minimal lochia, well controlled pain, and no other complaints.  Discharged with pain regimen and bowel regimen.   Advised to continue prenatal vitamins and daily PO Iron supplement.  Follow up with OB/PCP in 6 weeks    Follow up testing   --Need colposcopy 6 week post partum to follow up on ASCUS+ and HPV+ pap  -CBC to follow up on Anemia Condition at Discharge:  STABLE  Disposition: Discharge to Home    Physical exam:  Visit Vitals  BP 97/55 (BP 1 Location: Right arm, BP Patient Position: At rest)   Pulse 91   Temp 97.7 °F (36.5 °C)   Resp 18   LMP 06/30/2019 (Exact Date)   Breastfeeding? Yes       Exam:  Patient without distress. CTAB, no w/r/r/c               RRR, + S1 and S2, no m/r/g               Abdomen soft, fundus firm at level of umbilicus, non tender               Perineum with normal lochia noted. Lower extremities are negative for swelling, cords or tenderness. Patient Instructions:   Current Discharge Medication List      START taking these medications    Details   ibuprofen (MOTRIN) 800 mg tablet Take 1 Tab by mouth every eight (8) hours for 30 days. Indications: pain  Qty: 90 Tab, Refills: 0         CONTINUE these medications which have NOT CHANGED    Details   prenatal vit-calcium-iron-fa (PRENATAL PLUS WITH CALCIUM) 27 mg iron- 1 mg tab Take 1 Tab by mouth daily. Qty: 90 Tab, Refills: 1    Associated Diagnoses: Pregnancy, unspecified gestational age      ferrous sulfate (IRON) 325 mg (65 mg iron) EC tablet Take 1 Tab by mouth Daily (before breakfast). Qty: 90 Tab, Refills: 0    Associated Diagnoses: Anemia during pregnancy in third trimester         STOP taking these medications       PNV No12-Iron-FA-DSS-OM-3 29 mg iron-1 mg -50 mg CPKD Comments:   Reason for Stopping:         famotidine (PEPCID) 10 mg tablet Comments:   Reason for Stopping:               Reference my discharge instructions.     Follow-up Information     Follow up With Specialties Details Why Contact Info    Madeline Lopez MD Family Practice Call on 11/18/2019 Please call 20 [facility] and make an appointment for a 6 week post partum visit  [address]  [phone]      None    None ([phone]) Patient stated that they have no PCP              Signed By:  Jerry Umanzor, DO    Family Medicine Resident

## 2019-11-16 NOTE — LACTATION NOTE
This note was copied from a baby's chart. 1500 - Experienced breastfeeding mother who only  for previous 3 children. Mother currently feeding formula to infant now and baby not seen at breast.  Mother's plan is to both breast and bottle feed this infant. Reviewed to offer breast first and then formula. Mother verbalizes understanding. Mother has some friction damage on left nipple and RN to put Keri Frances's cream in for pharmacy. Care for sore/tender nipples discussed:  ways to improve positioning and latch practiced and discussed, hand express colostrum after feedings and let air dry, light application of lanolin, hydrogel pads, seek comfortable laid back feeding position, start feedings on least sore side first.    Educated parent that the natural, prone, position facilitates baby led breastfeeding behaviors and mother encouraged to try this position for next feed. Mother encouraged to call lactation for next feed.

## 2019-11-17 ENCOUNTER — TELEPHONE (OUTPATIENT)
Dept: OBGYN | Age: 34
End: 2019-11-17

## 2019-11-17 NOTE — TELEPHONE ENCOUNTER
[employer] [id number] used. Pt was discharged early from L&D yesterday. The clinic was closed and I was not able to make a follow up appointment for patient and her baby girl Lior. I called this AM and scheduled both mom and baby a follow up appointment with [facility]. I left VM stating baby's follow up is 11/18 @2PM with Dr. Darian Orta     Mom's 6 week PP visit is 12/30 at 3:35PM with Dr. Nasim Boogie D.O.    Family Medicine Resident PGY1

## 2019-11-25 ENCOUNTER — TELEPHONE (OUTPATIENT)
Dept: FAMILY MEDICINE CLINIC | Age: 34
End: 2019-11-25

## 2019-11-25 NOTE — TELEPHONE ENCOUNTER
----- Message from [employer] Page sent at 11/25/2019  4:22 PM EST -----  Regarding: Dr. Lamas Handler Message/Vendor Calls    Caller's first and last name:      Reason for call:  Prenatal visit    Callback required yes/no and why:  Yes, to schedule appt    Best contact number(s):  [phone]    Details to clarify the request:   needed    Jamglue Shantell